# Patient Record
Sex: FEMALE | Race: WHITE | HISPANIC OR LATINO | Employment: UNEMPLOYED | URBAN - METROPOLITAN AREA
[De-identification: names, ages, dates, MRNs, and addresses within clinical notes are randomized per-mention and may not be internally consistent; named-entity substitution may affect disease eponyms.]

---

## 2017-05-18 ENCOUNTER — APPOINTMENT (OUTPATIENT)
Dept: ULTRASOUND IMAGING | Facility: CLINIC | Age: 22
End: 2017-05-18
Attending: EMERGENCY MEDICINE

## 2017-05-18 ENCOUNTER — HOSPITAL ENCOUNTER (EMERGENCY)
Facility: CLINIC | Age: 22
Discharge: HOME | End: 2017-05-18
Attending: EMERGENCY MEDICINE | Admitting: EMERGENCY MEDICINE

## 2017-05-18 VITALS
WEIGHT: 134.48 LBS | OXYGEN SATURATION: 100 % | SYSTOLIC BLOOD PRESSURE: 109 MMHG | RESPIRATION RATE: 20 BRPM | DIASTOLIC BLOOD PRESSURE: 68 MMHG | HEART RATE: 91 BPM | TEMPERATURE: 98.3 F

## 2017-05-18 DIAGNOSIS — Z33.1 PREGNANCY, INCIDENTAL: ICD-10-CM

## 2017-05-18 DIAGNOSIS — S83.509A: ICD-10-CM

## 2017-05-18 DIAGNOSIS — R10.30 LOWER ABDOMINAL PAIN: ICD-10-CM

## 2017-05-18 DIAGNOSIS — M54.50 ACUTE MIDLINE LOW BACK PAIN WITHOUT SCIATICA: ICD-10-CM

## 2017-05-18 LAB
ABO + RH BLD: NORMAL
ABO + RH BLD: NORMAL
ALBUMIN UR-MCNC: NEGATIVE MG/DL
ANION GAP SERPL CALCULATED.3IONS-SCNC: 6 MMOL/L (ref 3–14)
APPEARANCE UR: CLEAR
B-HCG SERPL-ACNC: ABNORMAL IU/L (ref 0–5)
BACTERIA #/AREA URNS HPF: ABNORMAL /HPF
BASOPHILS # BLD AUTO: 0 10E9/L (ref 0–0.2)
BASOPHILS NFR BLD AUTO: 0.5 %
BILIRUB UR QL STRIP: NEGATIVE
BLOOD BANK CMNT PATIENT-IMP: NORMAL
BLOOD BANK CMNT PATIENT-IMP: NORMAL
BUN SERPL-MCNC: 7 MG/DL (ref 7–30)
CALCIUM SERPL-MCNC: 8.6 MG/DL (ref 8.5–10.1)
CHLORIDE SERPL-SCNC: 107 MMOL/L (ref 94–109)
CO2 SERPL-SCNC: 25 MMOL/L (ref 20–32)
COLOR UR AUTO: YELLOW
CREAT SERPL-MCNC: 0.46 MG/DL (ref 0.52–1.04)
DIFFERENTIAL METHOD BLD: ABNORMAL
EOSINOPHIL # BLD AUTO: 0 10E9/L (ref 0–0.7)
EOSINOPHIL NFR BLD AUTO: 0.3 %
ERYTHROCYTE [DISTWIDTH] IN BLOOD BY AUTOMATED COUNT: 15.8 % (ref 10–15)
FETAL CELL SCN BLD QL ROSETTE: NORMAL
GFR SERPL CREATININE-BSD FRML MDRD: ABNORMAL ML/MIN/1.7M2
GLUCOSE SERPL-MCNC: 93 MG/DL (ref 70–99)
GLUCOSE UR STRIP-MCNC: NEGATIVE MG/DL
HCG UR QL: POSITIVE
HCT VFR BLD AUTO: 38.2 % (ref 35–47)
HGB BLD-MCNC: 12 G/DL (ref 11.7–15.7)
HGB UR QL STRIP: NEGATIVE
IMM GRANULOCYTES # BLD: 0 10E9/L (ref 0–0.4)
IMM GRANULOCYTES NFR BLD: 0.3 %
KETONES UR STRIP-MCNC: 5 MG/DL
LEUKOCYTE ESTERASE UR QL STRIP: NEGATIVE
LYMPHOCYTES # BLD AUTO: 1.9 10E9/L (ref 0.8–5.3)
LYMPHOCYTES NFR BLD AUTO: 24.7 %
MCH RBC QN AUTO: 26.5 PG (ref 26.5–33)
MCHC RBC AUTO-ENTMCNC: 31.4 G/DL (ref 31.5–36.5)
MCV RBC AUTO: 84 FL (ref 78–100)
MICRO REPORT STATUS: NORMAL
MONOCYTES # BLD AUTO: 0.8 10E9/L (ref 0–1.3)
MONOCYTES NFR BLD AUTO: 9.7 %
MUCOUS THREADS #/AREA URNS LPF: PRESENT /LPF
NEUTROPHILS # BLD AUTO: 5.1 10E9/L (ref 1.6–8.3)
NEUTROPHILS NFR BLD AUTO: 64.5 %
NITRATE UR QL: NEGATIVE
NRBC # BLD AUTO: 0 10*3/UL
NRBC BLD AUTO-RTO: 0 /100
PH UR STRIP: 8 PH (ref 5–7)
PLATELET # BLD AUTO: 357 10E9/L (ref 150–450)
POTASSIUM SERPL-SCNC: 3.5 MMOL/L (ref 3.4–5.3)
RBC # BLD AUTO: 4.53 10E12/L (ref 3.8–5.2)
RBC #/AREA URNS AUTO: <1 /HPF (ref 0–2)
RH IG VIALS RECOM PATIENT: NORMAL
SODIUM SERPL-SCNC: 138 MMOL/L (ref 133–144)
SP GR UR STRIP: 1.01 (ref 1–1.03)
SPECIMEN SOURCE: NORMAL
SQUAMOUS #/AREA URNS AUTO: 1 /HPF (ref 0–1)
URN SPEC COLLECT METH UR: ABNORMAL
UROBILINOGEN UR STRIP-MCNC: 0 MG/DL (ref 0–2)
WBC # BLD AUTO: 7.9 10E9/L (ref 4–11)
WBC #/AREA URNS AUTO: <1 /HPF (ref 0–2)
WET PREP SPEC: NORMAL

## 2017-05-18 PROCEDURE — 81001 URINALYSIS AUTO W/SCOPE: CPT | Performed by: EMERGENCY MEDICINE

## 2017-05-18 PROCEDURE — 87210 SMEAR WET MOUNT SALINE/INK: CPT | Performed by: EMERGENCY MEDICINE

## 2017-05-18 PROCEDURE — 96361 HYDRATE IV INFUSION ADD-ON: CPT

## 2017-05-18 PROCEDURE — 81025 URINE PREGNANCY TEST: CPT | Performed by: EMERGENCY MEDICINE

## 2017-05-18 PROCEDURE — 85025 COMPLETE CBC W/AUTO DIFF WBC: CPT | Performed by: EMERGENCY MEDICINE

## 2017-05-18 PROCEDURE — 99284 EMERGENCY DEPT VISIT MOD MDM: CPT | Mod: 25

## 2017-05-18 PROCEDURE — 84702 CHORIONIC GONADOTROPIN TEST: CPT | Performed by: EMERGENCY MEDICINE

## 2017-05-18 PROCEDURE — 96360 HYDRATION IV INFUSION INIT: CPT

## 2017-05-18 PROCEDURE — 80048 BASIC METABOLIC PNL TOTAL CA: CPT | Performed by: EMERGENCY MEDICINE

## 2017-05-18 PROCEDURE — 87591 N.GONORRHOEAE DNA AMP PROB: CPT | Performed by: EMERGENCY MEDICINE

## 2017-05-18 PROCEDURE — 85461 HEMOGLOBIN FETAL: CPT | Performed by: EMERGENCY MEDICINE

## 2017-05-18 PROCEDURE — 86900 BLOOD TYPING SEROLOGIC ABO: CPT | Performed by: EMERGENCY MEDICINE

## 2017-05-18 PROCEDURE — 86901 BLOOD TYPING SEROLOGIC RH(D): CPT | Performed by: EMERGENCY MEDICINE

## 2017-05-18 PROCEDURE — 87491 CHLMYD TRACH DNA AMP PROBE: CPT | Performed by: EMERGENCY MEDICINE

## 2017-05-18 PROCEDURE — 76801 OB US < 14 WKS SINGLE FETUS: CPT

## 2017-05-18 PROCEDURE — 25000128 H RX IP 250 OP 636: Performed by: EMERGENCY MEDICINE

## 2017-05-18 RX ORDER — ACETAMINOPHEN AND CODEINE PHOSPHATE 300; 30 MG/1; MG/1
1-2 TABLET ORAL EVERY 6 HOURS PRN
Qty: 20 TABLET | Refills: 0 | Status: SHIPPED | OUTPATIENT
Start: 2017-05-18 | End: 2022-10-30

## 2017-05-18 RX ORDER — LIDOCAINE 40 MG/G
CREAM TOPICAL
Status: DISCONTINUED | OUTPATIENT
Start: 2017-05-18 | End: 2017-05-18 | Stop reason: HOSPADM

## 2017-05-18 RX ORDER — MORPHINE SULFATE 4 MG/ML
4 INJECTION, SOLUTION INTRAMUSCULAR; INTRAVENOUS ONCE
Status: DISCONTINUED | OUTPATIENT
Start: 2017-05-18 | End: 2017-05-18 | Stop reason: HOSPADM

## 2017-05-18 RX ADMIN — SODIUM CHLORIDE 1000 ML: 9 INJECTION, SOLUTION INTRAVENOUS at 16:38

## 2017-05-18 ASSESSMENT — ENCOUNTER SYMPTOMS
ABDOMINAL PAIN: 1
BACK PAIN: 1

## 2017-05-18 NOTE — ED PROVIDER NOTES
History     Chief Complaint:  Back Pain  Abdominal Pain      HPI   Adrienne Guzman is a 21 year old female (), roughly 9 weeks pregnant, who presents after a fall with back pain and abdominal pain. The patient reports that she was carrying her 1-year-old son down carpeted stairs when she tripped and fell down the stairs around 1400 today. She reports that she bounced on her buttock and back until she reached the last step. After the fall, she immediately developed pain in her back and a diffuse soreness in her left leg. She denies hitting her head. The patient then started to develop pain in her lower abdomen which she describes as intermittent pounding and sharp pain that is more severe than menstrual cramps but not as severe as contractions. The patient denies vaginal bleeding. She reports that her abdomen may have been struck by her son's knee, but she is not sure. She presents for concern for her pregnancy. She reports that her pregnancy has been uncomplicated aside from intermittent nausea and lack of appetite. She has not had an ultrasound, but has been seen by a midwife with [facility] in [place].    Allergies:  NKDA    Medications:    Folic acid    Past Medical History:    History reviewed. No pertinent past medical history.      Past Surgical History:    History reviewed. No pertinent past surgical history.     Family History:    History reviewed. No pertinent family history.      Social History:  The patient presents with female friend.    Review of Systems   Gastrointestinal: Positive for abdominal pain.   Genitourinary: Negative for vaginal bleeding.   Musculoskeletal: Positive for back pain.        Positive for left leg soreness.   All other systems reviewed and are negative.    Physical Exam   First Vitals:  BP: 113/69  Pulse: 91  Temp: 98.3  F (36.8  C)  Resp: 20  Weight: 61 kg (134 lb 7.7 oz)  SpO2: 100 %      Physical Exam  General: The patient is alert, in no respiratory  distress.    HENT: Mucous membranes moist.    Cardiovascular: Regular rate and rhythm. Good pulses in all four extremities. Normal capillary refill and skin turgor.     Respiratory: Lungs are clear. No nasal flaring. No retractions. No wheezing, no crackles.    Gastrointestinal: Discomfort with palpation of suprapubic region. Abdomen soft. No guarding, no rebound. No palpable hernias.     Pelvic: No bleeding. Small amount of discharge.     Musculoskeletal: Lower lumbar midline tenderness. No gross deformity.     Skin: No rashes or petechiae.     Neurologic: The patient is alert and oriented x3. GCS 15. No testable cranial nerve deficit. Follows commands with clear and appropriate speech. Gives appropriate answers. Good strength in all extremities. No gross neurologic deficit. Gross sensation intact. Pupils are round and reactive. No meningismus.     Lymphatic: No cervical adenopathy. No lower extremity swelling.    Psychiatric: The patient is non-tearful.     Emergency Department Course     Imaging:  Radiographic findings were communicated with the patient who voiced understanding of the findings.    OB US, <14 Weeks, per radiology:   Single viable intrauterine gestation. Heart rate is 163 bpm.     Laboratory:  CBC: WBC 7.9 (WNL) HGB 12 (WNL)  (WNL) MCHC 31.4 (L) RDW 15.8 (H)  BMP: Creatinine 0.46 (L) Glucose 93 (WNL) Rest WNL   Rh Immune Globulin Study: ABO O RH Pos  HCG Quantitative: 44288      UA: Clear, yellow urine; Ketones 5 (A) pH 8 (H) Bacteria Few (A) Mucous Present (A) Rest WNL  HCG Qualitative: Positive      Wet Prep: Moderate PMN's seen; No yeast seen; No clue cells seen; No Trichomonas seen;  Neisseria Gonorrhea PCR: Pending  Chlamydia Trachomatis PCR: Pending       Interventions:  1638: Normal Saline, 1000 mL, IV injection     ED Course:  Nursing notes and vitals reviewed.  I performed an exam of the patient as documented above.     1810: I performed a pelvic exam. Wet prep sent. Patient reports  that her pain is improving.  1911: I checked in with and updated the patient. She is doing well. She is complaining of bilateral knee pain, but there is no focal tenderness.   1918: I updated patient on results. We discussed plan. She is ready for discharge.    I personally reviewed the laboratory and imaging results with the patient and answered all related questions prior to discharge.   Findings and plan explained to the patient. Patient discharged home with instructions regarding supportive care, medications, and reasons to return. The importance of close follow-up was reviewed.     Impression & Plan      Medical Decision Making:  Adrienne Guzman is a 21 year old female who is 9 weeks pregnant and unfortunately slipped down the stairs. It sounds like thankfully she fell on her bottom and bounced down the stairs and therefore there is not a lot of force because this is a series of small falls. She mainly complains of back pain. I did not feel like at her stage in the pregnancy with lack of bleeding that there was a high risk to the pregnancy. However, she was complaining of some low abdominal cramping. Cervix however was closed. Her Rh type was positive and her ultrasound here looked reassuring. I did discuss with the patient that I cannot rule out a subchorionic hemorrhage and ultrasound is not 100% sensitive to that. She however was observed here and was otherwise stable. Her labs are reassuring. She did complain of some knee pain but we will not do x-rays as she is at low risk for fracture. We discussed symptomatic treatments. She was discharged home with pain medication and told not to take ibuprofen. She will follow up with her midwife and primary care doctor. She    Diagnosis:    ICD-10-CM    1. Lower abdominal pain R10.30    2. Acute midline low back pain without sciatica M54.5    3. Pregnancy, incidental Z33.1    4. Sprain of cruciate ligament of knee, unspecified laterality, initial  encounter S83.509A      Disposition:   Discharge to home with primary care follow up.     Discharge Medications:   New Prescriptions    ACETAMINOPHEN-CODEINE (TYLENOL/CODEINE #3) 300-30 MG PER TABLET    Take 1-2 tablets by mouth every 6 hours as needed for pain     IKatie, am serving as a scribe on 5/18/2017 at 4:22 PM to personally document services performed by Lupillo Montes De Oca MD, based on my observations and the provider's statements to me.           Lupillo Montes De Oca MD  05/18/17 [id number]

## 2017-05-18 NOTE — ED NOTES
"Pt is 9 weeks pregnant - EDC 12/21/2017. Pt fell down 3-4 steps, states that she slipped. Pt is now have low back pain and low \"sharp\" abd pain. Denies vaginal bleeding or discharge.   "

## 2017-05-18 NOTE — ED AVS SNAPSHOT
[facility] Emergency Department    [address] [place] [address]    Phone:  [phone]    Fax:  [phone]                                       Adrienne Guzman   MRN: [id number]    Department:  [facility] Emergency Department   Date of Visit:  5/18/2017           Patient Information     Date Of Birth          1995        Your diagnoses for this visit were:     Lower abdominal pain     Acute midline low back pain without sciatica     Pregnancy, incidental     Sprain of cruciate ligament of knee, unspecified laterality, initial encounter        You were seen by Lupillo Montes De Oca MD.      Follow-up Information     Follow up with Katarina Fitzpatrick In 1 day.    Contact information:    [address]  Attn: [facility] [id number]          Discharge Instructions       Discharge Instructions  Trauma    You were seen today for an injury due to some kind of trauma (crash, fall, etc.).  Some injuries may not show up until after you leave the Emergency Department.  It is important that you pay attention to these instructions and follow-up with your regular doctor as instructed.    Return to the Emergency Department right away if:    You have abdominal pain or bruises, chest pain, pain in a new area, or pain that is getting worse.    You get short of breath.    You develop a fever over 101 degrees.    You have weakness in your arms or legs.    You faint or you are very lightheaded.    You have any new symptoms, you are feeling weak or unusually ill, or something worries you.     Injuries to the brain are possible with any accident.  Return right away if you have confusion, vomiting more than once, difficulty walking or a headache that is getting worse. Bring a child or a person who can t talk back if they seem to be behaving in an abnormal way.      MORE INFORMATION:    General Injuries:    Aches and pains are usually worse the day after your  accident, but should not be severe, and should start getting better after that. Aches and pains are common in the neck and back.    Injuries from your accident may prevent you from working.  Follow-up with your regular doctor to get a work note and to find out how long you will not be able to work.    Pain medications or your injuries may make it unsafe for you to drive or operate machinery.    Use ice to injured areas for the first one or two days. Apply a bag of ice wrapped in a cloth for about 15 minutes at a time. You can do this as often as once an hour. Do not sleep with an ice pack, since it can burn you.     You can use non-prescription pain medicine, like Tylenol  (acetaminophen)be sure you aren t taking more than 3000 mg per day.    Limit your activity for at least one or two days. Avoid doing things that hurt.    You need to see your doctor if any injured area is not back to normal in 1 week.    Fractures, Sprains, and Strains:    Return to the Emergency Department right away if your injured area gets more painful, if the splint or dressing seems to be too tight, if it gets numb or tingly past the injury, or if the area past the injury gets pale, blue, or cold.    Use your crutches if you were given them today. Don t put weight on the injured area until the pain is gone.    Keep the injured area above the level of your heart while laying or sitting down.  This well help lessen the swelling (puffiness) and the pain.    You may use an elastic bandage (Ace  Wrap) if it makes you more comfortable. Wrap it just tight enough to provide mild compression, and loosen it if you get swelling past the bandage.    Note about X-rays: If you had x-rays done today, they were read by your emergency physician. They will also be read later by a radiologist. We will contact you if the radiologist thinks they show something different than the emergency physician did. Remember that there are some fractures (breaks in the bone)  that can t be seen right away. Even if your x-rays today were normal, you must see your doctor in clinic to re-check.     Splints:    A splint put on in the Emergency Department is temporary. Your regular doctor or orthopedic doctor will remove it, and replace it with a cast or boot if needed.    Keep the splint dry. Cover it with a plastic bag when you wash. Even with a plastic bag, you still can t get in water or let water get right on it. If it does get wet, you should come back or see your doctor to have it replaced.    Do not put objects inside the splint to scratch.    If there is an elastic bandage (Ace  Wrap) holding the splint on this may be loosened a little to relieve pressure or pain.  If pain continues return to the Emergency Department right away.    Return if the splint starts cutting into your skin.    Do not remove your splint by yourself unless told to by your doctor. You can t take it off and put it back on again.     Wounds:    Infections can follow many injuries. Watch for fevers, redness spreading from the wound, pus or stitches that open up. Return here or see your doctor if these happen.    There can always be glass, wood, dirt or other things in any wound.  They won t always show up even on x-rays.  If a wound doesn t heal, this may be why, and it is important to follow-up with your regular doctor. Small pieces of glass or other materials may work their way out on their own.    Cuts or scrapes may start to bleed after leaving the Emergency Department.  If this happens, hold pressure on the bleeding area with a clean cloth or put pressure over the bandage.  If the bleeding doesn t stop after you use constant pressure for   hour, you should return to the Emergency Department for further treatment.    Any bandage or dressing put on here should be removed in 12-24 hours, or as your doctor instructs. Remove the dressing sooner if it seems too tight or painful, or if it is getting numb, tingly, or  pale past the dressing.    After you take off the dressing, wash the cut or scrape with soap and water once or twice a day.    Apply ointment like Bacitracin  (polypeptide antibiotic) to scrapes or cuts, and keep them covered with a Band-Aid  or gauze if possible, until they heal up or until your stitches are taken out.    Dermabond  or Steri-Strips  should be left alone and will come off by themselves.  Dissolving stitches should go away or fall out within about a week.    Regular stitches need to be taken out by your doctor in clinic.  Call today and schedule an appointment.  Leave your stitches in for as long as you were told today.    Most injuries are preventable!  As your local emergency physicians, we encourage you to:    Wear your seat belt.    Do not talk on your cell phone while driving.    Do not read or send text messages while driving.    Wear a bike or motorcycle helmet.    Wear a helmet while skiing and snowboarding.    Wear personal flotation devices at all times while on the water.    Always have your child in a car seat.    Do not allow children less than 12 years old to ride in the front seat.    Go to the CDC website to find more information on preventing injures:  http://www.cdc.gov/injury/index.html    If you were given a prescription for medicine here today, be sure to read all of the information (including the package insert) that comes with your prescription.  This will include important information about the medicine, its side effects, and any warnings that you need to know about.  The pharmacist who fills the prescription can provide more information and answer questions you may have about the medicine.  If you have questions or concerns that the pharmacist cannot address, please call or return to the Emergency Department.     Remember that you can always come back to the Emergency Department if you are not able to see your regular doctor in the amount of time listed above, if you get any  new symptoms, or if there is anything that worries you.          24 Hour Appointment Hotline       To make an appointment at any [facility] call [phone] ([phone]). If you don't have a family doctor or clinic, we will help you find one. [place] clinics are conveniently located to serve the needs of you and your family.             Review of your medicines      START taking        Dose / Directions Last dose taken    acetaminophen-codeine 300-30 MG per tablet   Commonly known as:  TYLENOL/codeine #3   Dose:  1-2 tablet   Quantity:  20 tablet        Take 1-2 tablets by mouth every 6 hours as needed for pain   Refills:  0          Our records show that you are taking the medicines listed below. If these are incorrect, please call your family doctor or clinic.        Dose / Directions Last dose taken    FOLIC ACID PO   Dose:  1 mg        Take 1 mg by mouth daily   Refills:  0                Prescriptions were sent or printed at these locations (1 Prescription)                   Other Prescriptions                Printed at Department/Unit printer (1 of 1)         acetaminophen-codeine (TYLENOL/CODEINE #3) 300-30 MG per tablet                Procedures and tests performed during your visit     Basic metabolic panel    CBC with platelets differential    Chlamydia trachomatis PCR    HCG qualitative urine    HCG quantitative pregnancy    Neisseria gonorrhoea PCR    Peripheral IV: Standard    Rh Immune Globulin Study    Rho (D) immune globulin (RhoGam) Lab Study    UA with Microscopic reflex to Culture    US OB < 14 Weeks Single    Vital signs    Wet prep      Orders Needing Specimen Collection     None      Pending Results     Date and Time Order Name Status Description    5/18/2017 1816 Neisseria gonorrhoea PCR In process     5/18/2017 1816 Chlamydia trachomatis PCR In process             Pending Culture Results     Date and Time Order Name Status Description    5/18/2017 1816 Neisseria gonorrhoea PCR  In process     5/18/2017 1816 Chlamydia trachomatis PCR In process             Pending Results Instructions     If you had any lab results that were not finalized at the time of your Discharge, you can call the ED Lab Result RN at [phone]. You will be contacted by this team for any positive Lab results or changes in treatment. The nurses are available 7 days a week from 10A to 6:30P.  You can leave a message 24 hours per day and they will return your call.        Test Results From Your Hospital Stay        5/18/2017  6:23 PM      Component Results     Component Value Ref Range & Units Status    HCG Qual Urine Positive (A) NEG Final         5/18/2017  6:21 PM      Component Results     Component Value Ref Range & Units Status    Color Urine Yellow  Final    Appearance Urine Clear  Final    Glucose Urine Negative NEG mg/dL Final    Bilirubin Urine Negative NEG Final    Ketones Urine 5 (A) NEG mg/dL Final    Specific Gravity Urine 1.012 1.003 - 1.035 Final    Blood Urine Negative NEG Final    pH Urine 8.0 (H) 5.0 - 7.0 pH Final    Protein Albumin Urine Negative NEG mg/dL Final    Urobilinogen mg/dL 0.0 0.0 - 2.0 mg/dL Final    Nitrite Urine Negative NEG Final    Leukocyte Esterase Urine Negative NEG Final    Source Midstream Urine  Final    WBC Urine <1 0 - 2 /HPF Final    RBC Urine <1 0 - 2 /HPF Final    Bacteria Urine Few (A) NEG /HPF Final    Squamous Epithelial /HPF Urine 1 0 - 1 /HPF Final    Mucous Urine Present (A) NEG /LPF Final         5/18/2017  4:50 PM      Component Results     Component Value Ref Range & Units Status    WBC 7.9 4.0 - 11.0 10e9/L Final    RBC Count 4.53 3.8 - 5.2 10e12/L Final    Hemoglobin 12.0 11.7 - 15.7 g/dL Final    Hematocrit 38.2 35.0 - 47.0 % Final    MCV 84 78 - 100 fl Final    MCH 26.5 26.5 - 33.0 pg Final    MCHC 31.4 (L) 31.5 - 36.5 g/dL Final    RDW 15.8 (H) 10.0 - 15.0 % Final    Platelet Count 357 150 - 450 10e9/L Final    Diff Method Automated Method  Final    %  Neutrophils 64.5 % Final    % Lymphocytes 24.7 % Final    % Monocytes 9.7 % Final    % Eosinophils 0.3 % Final    % Basophils 0.5 % Final    % Immature Granulocytes 0.3 % Final    Nucleated RBCs 0 0 /100 Final    Absolute Neutrophil 5.1 1.6 - 8.3 10e9/L Final    Absolute Lymphocytes 1.9 0.8 - 5.3 10e9/L Final    Absolute Monocytes 0.8 0.0 - 1.3 10e9/L Final    Absolute Eosinophils 0.0 0.0 - 0.7 10e9/L Final    Absolute Basophils 0.0 0.0 - 0.2 10e9/L Final    Abs Immature Granulocytes 0.0 0 - 0.4 10e9/L Final    Absolute Nucleated RBC 0.0  Final         5/18/2017  5:05 PM      Component Results     Component Value Ref Range & Units Status    Sodium 138 133 - 144 mmol/L Final    Potassium 3.5 3.4 - 5.3 mmol/L Final    Chloride 107 94 - 109 mmol/L Final    Carbon Dioxide 25 20 - 32 mmol/L Final    Anion Gap 6 3 - 14 mmol/L Final    Glucose 93 70 - 99 mg/dL Final    Urea Nitrogen 7 7 - 30 mg/dL Final    Creatinine 0.46 (L) 0.52 - 1.04 mg/dL Final    GFR Estimate >90  Non  GFR Calc   >60 mL/min/1.7m2 Final    GFR Estimate If Black >90   GFR Calc   >60 mL/min/1.7m2 Final    Calcium 8.6 8.5 - 10.1 mg/dL Final         5/18/2017  4:47 PM      Component Results     Component    Rhogam Order    Order received   See Rhogam Study/Suitability                 5/18/2017  5:42 PM      Component Results     Component    ABO    O    RH(D)     Pos    Fetal Blood Screen    Canceled, Test credited    Blood Bank Comment    Not suitable for Rh Immune Globulin  PATIENT RH POSITIVE      Amount of RHIG Required    Not suitable for Rh Immune Globulin         5/18/2017  6:45 PM      Narrative     US OB < 14 WEEKS SINGLE  5/18/2017 5:33 PM     HISTORY:  fall down stairs with abd crampiing, eval pregnancy    COMPARISON: None:    TECHNIQUE: Transabdominal imaging was performed.    FINDINGS:  There is an intrauterine gestation. Mean sac diameter is  3.4 cm corresponding to an age of 8 weeks 6 days. A yolk sac is  seen.  An embryo is seen. Crown-rump length 2.1 cm corresponding to an age of  8 weeks 5 days. Heart rate 1 63 bpm. Estimated date of delivery by  crown-rump length is 12/23/2017. Right ovary is not seen. Left ovary  appears normal. No hemorrhage.        Impression     IMPRESSION: Single viable intrauterine gestation. Heart rate is 1 63  bpm.    VAMSI OSEI MD         5/18/2017  6:51 PM      Component Results     Component Value Ref Range & Units Status    HCG Quantitative Serum 32167 (H) 0 - 5 IU/L Final    Specimen run with a dilution         5/18/2017  6:31 PM      Component Results     Component    Specimen Description    Vagina    Wet Prep    Moderate PMNs seen  No yeast seen  No clue cells seen  No Trichomonas seen      Micro Report Status    FINAL 05/18/2017 5/18/2017  6:23 PM         5/18/2017  6:23 PM                Clinical Quality Measure: Blood Pressure Screening     Your blood pressure was checked while you were in the emergency department today. The last reading we obtained was  BP: 109/68 . Please read the guidelines below about what these numbers mean and what you should do about them.  If your systolic blood pressure (the top number) is less than 120 and your diastolic blood pressure (the bottom number) is less than 80, then your blood pressure is normal. There is nothing more that you need to do about it.  If your systolic blood pressure (the top number) is 120-139 or your diastolic blood pressure (the bottom number) is 80-89, your blood pressure may be higher than it should be. You should have your blood pressure rechecked within a year by a primary care provider.  If your systolic blood pressure (the top number) is 140 or greater or your diastolic blood pressure (the bottom number) is 90 or greater, you may have high blood pressure. High blood pressure is treatable, but if left untreated over time it can put you at risk for heart attack, stroke, or kidney failure. You should have your  "blood pressure rechecked by a primary care provider within the next 4 weeks.  If your provider in the emergency department today gave you specific instructions to follow-up with your doctor or provider even sooner than that, you should follow that instruction and not wait for up to 4 weeks for your follow-up visit.        Thank you for choosing [facility]       Thank you for choosing [facility] for your care. Our goal is always to provide you with excellent care. Hearing back from our patients is one way we can continue to improve our services. Please take a few minutes to complete the written survey that you may receive in the mail after you visit with us. Thank you!        [employer] Information     [facility] lets you send messages to your doctor, view your test results, renew your prescriptions, schedule appointments and more. To sign up, go to [email or web address] . Click on \"Log in\" on the left side of the screen, which will take you to the Welcome page. Then click on \"Sign up Now\" on the right side of the page.     You will be asked to enter the access code listed below, as well as some personal information. Please follow the directions to create your username and password.     Your access code is: [email or web address]  Expires: 2017  7:15 PM     Your access code will  in 90 days. If you need help or a new code, please call your [facility] clinic or [phone].        Care EveryWhere ID     This is your Care EveryWhere ID. This could be used by other organizations to access your [facility] medical records  [id number]        After Visit Summary       This is your record. Keep this with you and show to your community pharmacist(s) and doctor(s) at your next visit.                  "

## 2017-05-18 NOTE — ED AVS SNAPSHOT
[facility] Emergency Department    [address]    [facility] [address]    Phone:  [phone]    Fax:  [phone]                                       Adrienne Guzman   MRN: [id number]    Department:  [facility] Emergency Department   Date of Visit:  5/18/2017           After Visit Summary Signature Page     I have received my discharge instructions, and my questions have been answered. I have discussed any challenges I see with this plan with the nurse or doctor.    ..........................................................................................................................................  Patient/Patient Representative Signature      ..........................................................................................................................................  Patient Representative Print Name and Relationship to Patient    ..................................................               ................................................  Date                                            Time    ..........................................................................................................................................  Reviewed by Signature/Title    ...................................................              ..............................................  Date                                                            Time

## 2017-05-19 LAB
C TRACH DNA SPEC QL NAA+PROBE: NORMAL
N GONORRHOEA DNA SPEC QL NAA+PROBE: NORMAL
SPECIMEN SOURCE: NORMAL
SPECIMEN SOURCE: NORMAL

## 2017-05-19 NOTE — DISCHARGE INSTRUCTIONS
Discharge Instructions  Trauma    You were seen today for an injury due to some kind of trauma (crash, fall, etc.).  Some injuries may not show up until after you leave the Emergency Department.  It is important that you pay attention to these instructions and follow-up with your regular doctor as instructed.    Return to the Emergency Department right away if:    You have abdominal pain or bruises, chest pain, pain in a new area, or pain that is getting worse.    You get short of breath.    You develop a fever over 101 degrees.    You have weakness in your arms or legs.    You faint or you are very lightheaded.    You have any new symptoms, you are feeling weak or unusually ill, or something worries you.     Injuries to the brain are possible with any accident.  Return right away if you have confusion, vomiting more than once, difficulty walking or a headache that is getting worse. Bring a child or a person who can t talk back if they seem to be behaving in an abnormal way.      MORE INFORMATION:    General Injuries:    Aches and pains are usually worse the day after your accident, but should not be severe, and should start getting better after that. Aches and pains are common in the neck and back.    Injuries from your accident may prevent you from working.  Follow-up with your regular doctor to get a work note and to find out how long you will not be able to work.    Pain medications or your injuries may make it unsafe for you to drive or operate machinery.    Use ice to injured areas for the first one or two days. Apply a bag of ice wrapped in a cloth for about 15 minutes at a time. You can do this as often as once an hour. Do not sleep with an ice pack, since it can burn you.     You can use non-prescription pain medicine, like Tylenol  (acetaminophen)be sure you aren t taking more than 3000 mg per day.    Limit your activity for at least one or two days. Avoid doing things that hurt.    You need to see your  doctor if any injured area is not back to normal in 1 week.    Fractures, Sprains, and Strains:    Return to the Emergency Department right away if your injured area gets more painful, if the splint or dressing seems to be too tight, if it gets numb or tingly past the injury, or if the area past the injury gets pale, blue, or cold.    Use your crutches if you were given them today. Don t put weight on the injured area until the pain is gone.    Keep the injured area above the level of your heart while laying or sitting down.  This well help lessen the swelling (puffiness) and the pain.    You may use an elastic bandage (Ace  Wrap) if it makes you more comfortable. Wrap it just tight enough to provide mild compression, and loosen it if you get swelling past the bandage.    Note about X-rays: If you had x-rays done today, they were read by your emergency physician. They will also be read later by a radiologist. We will contact you if the radiologist thinks they show something different than the emergency physician did. Remember that there are some fractures (breaks in the bone) that can t be seen right away. Even if your x-rays today were normal, you must see your doctor in clinic to re-check.     Splints:    A splint put on in the Emergency Department is temporary. Your regular doctor or orthopedic doctor will remove it, and replace it with a cast or boot if needed.    Keep the splint dry. Cover it with a plastic bag when you wash. Even with a plastic bag, you still can t get in water or let water get right on it. If it does get wet, you should come back or see your doctor to have it replaced.    Do not put objects inside the splint to scratch.    If there is an elastic bandage (Ace  Wrap) holding the splint on this may be loosened a little to relieve pressure or pain.  If pain continues return to the Emergency Department right away.    Return if the splint starts cutting into your skin.    Do not remove your splint  by yourself unless told to by your doctor. You can t take it off and put it back on again.     Wounds:    Infections can follow many injuries. Watch for fevers, redness spreading from the wound, pus or stitches that open up. Return here or see your doctor if these happen.    There can always be glass, wood, dirt or other things in any wound.  They won t always show up even on x-rays.  If a wound doesn t heal, this may be why, and it is important to follow-up with your regular doctor. Small pieces of glass or other materials may work their way out on their own.    Cuts or scrapes may start to bleed after leaving the Emergency Department.  If this happens, hold pressure on the bleeding area with a clean cloth or put pressure over the bandage.  If the bleeding doesn t stop after you use constant pressure for   hour, you should return to the Emergency Department for further treatment.    Any bandage or dressing put on here should be removed in 12-24 hours, or as your doctor instructs. Remove the dressing sooner if it seems too tight or painful, or if it is getting numb, tingly, or pale past the dressing.    After you take off the dressing, wash the cut or scrape with soap and water once or twice a day.    Apply ointment like Bacitracin  (polypeptide antibiotic) to scrapes or cuts, and keep them covered with a Band-Aid  or gauze if possible, until they heal up or until your stitches are taken out.    Dermabond  or Steri-Strips  should be left alone and will come off by themselves.  Dissolving stitches should go away or fall out within about a week.    Regular stitches need to be taken out by your doctor in clinic.  Call today and schedule an appointment.  Leave your stitches in for as long as you were told today.    Most injuries are preventable!  As your local emergency physicians, we encourage you to:    Wear your seat belt.    Do not talk on your cell phone while driving.    Do not read or send text messages while  driving.    Wear a bike or motorcycle helmet.    Wear a helmet while skiing and snowboarding.    Wear personal flotation devices at all times while on the water.    Always have your child in a car seat.    Do not allow children less than 12 years old to ride in the front seat.    Go to the CDC website to find more information on preventing injures:  http://www.cdc.gov/injury/index.html    If you were given a prescription for medicine here today, be sure to read all of the information (including the package insert) that comes with your prescription.  This will include important information about the medicine, its side effects, and any warnings that you need to know about.  The pharmacist who fills the prescription can provide more information and answer questions you may have about the medicine.  If you have questions or concerns that the pharmacist cannot address, please call or return to the Emergency Department.     Remember that you can always come back to the Emergency Department if you are not able to see your regular doctor in the amount of time listed above, if you get any new symptoms, or if there is anything that worries you.

## 2018-04-15 ENCOUNTER — HOSPITAL ENCOUNTER (EMERGENCY)
Facility: CLINIC | Age: 23
Discharge: HOME | End: 2018-04-16
Attending: EMERGENCY MEDICINE | Admitting: EMERGENCY MEDICINE

## 2018-04-15 DIAGNOSIS — N30.91 HEMORRHAGIC CYSTITIS: ICD-10-CM

## 2018-04-15 LAB
ALBUMIN UR-MCNC: 30 MG/DL
APPEARANCE UR: ABNORMAL
BACTERIA #/AREA URNS HPF: ABNORMAL /HPF
BILIRUB UR QL STRIP: NEGATIVE
COLOR UR AUTO: YELLOW
GLUCOSE UR STRIP-MCNC: NEGATIVE MG/DL
HCG UR QL: NEGATIVE
HGB UR QL STRIP: ABNORMAL
KETONES UR STRIP-MCNC: NEGATIVE MG/DL
LEUKOCYTE ESTERASE UR QL STRIP: ABNORMAL
MUCOUS THREADS #/AREA URNS LPF: PRESENT /LPF
NITRATE UR QL: NEGATIVE
PH UR STRIP: 5 PH (ref 5–7)
RBC #/AREA URNS AUTO: 81 /HPF (ref 0–2)
SOURCE: ABNORMAL
SP GR UR STRIP: 1.02 (ref 1–1.03)
SQUAMOUS #/AREA URNS AUTO: 6 /HPF (ref 0–1)
UROBILINOGEN UR STRIP-MCNC: 0 MG/DL (ref 0–2)
WBC #/AREA URNS AUTO: 95 /HPF (ref 0–5)

## 2018-04-15 PROCEDURE — 25000132 ZZH RX MED GY IP 250 OP 250 PS 637: Performed by: EMERGENCY MEDICINE

## 2018-04-15 PROCEDURE — 81025 URINE PREGNANCY TEST: CPT | Performed by: EMERGENCY MEDICINE

## 2018-04-15 PROCEDURE — 99283 EMERGENCY DEPT VISIT LOW MDM: CPT

## 2018-04-15 PROCEDURE — 81001 URINALYSIS AUTO W/SCOPE: CPT | Performed by: EMERGENCY MEDICINE

## 2018-04-15 RX ORDER — PHENAZOPYRIDINE HYDROCHLORIDE 100 MG/1
200 TABLET, FILM COATED ORAL ONCE
Status: COMPLETED | OUTPATIENT
Start: 2018-04-15 | End: 2018-04-15

## 2018-04-15 RX ADMIN — PHENAZOPYRIDINE HYDROCHLORIDE 200 MG: 100 TABLET ORAL at 23:42

## 2018-04-15 NOTE — ED AVS SNAPSHOT
[facility] Emergency Department    [address] [place] [address]    Phone:  [phone]    Fax:  [phone]                                       Adrienne Guzman   MRN: [id number]    Department:  [facility] Emergency Department   Date of Visit:  4/15/2018           Patient Information     Date Of Birth          1995        Your diagnoses for this visit were:     Hemorrhagic cystitis        You were seen by Juan Carlos Olvera MD.      Follow-up Information     Follow up with Madeline Fitzpatrick Schedule an appointment as soon as possible for a visit in 1 week.    Why:  As needed    Contact information:    [address]  Attn: [facility] [address]          Discharge Instructions       Discharge Instructions  Urinary Tract Infection  You or your child have been diagnosed with a urinary tract infection, or UTI. The urinary tract includes the kidneys (which make urine/pee), ureters (the tubes that carry urine/pee from the kidneys to the bladder), the bladder (which stores urine/pee), and urethra (the tube that carries urine/pee out of the bladder). Urinary tract infections occur when bacteria travel up the urethra into the bladder (bladder infection) and, in some cases, from there into the kidneys (kidney infection).  Generally, every Emergency Department visit should have a follow-up clinic visit with either a primary or a specialty clinic/provider. Please follow-up as instructed by your emergency provider today.  Return to the Emergency Department if:    You or your child have severe back pain.    You or your child are vomiting (throwing up) so that you cannot take your medicine.    You or your child have a new fever (had not previously had a fever) over 101 F.    You or your child have confusion or are very weak, or feel very ill.    Your child seems much more ill, will not wake up, will not respond right, or is crying for a long time and  will not calm down.    You or your child are showing signs of dehydration. These signs may include decreased urination (pee), dry mouth/gums/tongue, or decreased activity.    Follow-up with your provider:     Children under 24 months need to be seen by their regular provider within one week after a diagnosis of a UTI. It may be necessary to do some more tests to look at the child s kidney or bladder.    You should begin to feel better within 24 - 48 hours of starting your antibiotic; follow-up with your regular clinic/doctor/provider if this is not the case.    Treatment:     You will be treated with an antibiotic to kill the bacteria. We have to make an educated guess, based on what we know about common bacteria and antibiotics, as to which antibiotic will work for your infection. We will be correct most times but there will be some cases where the antibiotic chosen is not correct (see urine cultures below).    Take a pain medication such as acetaminophen (Tylenol ) or ibuprofen (Advil , Motrin , Nuprin ).    Phenazopyridine (Pyridium , Uristat ) is a prescription medication that numbs the bladder to reduce the burning pain of some UTIs.  The same medication is available in a non-prescription version (Azo-Standard , Urodol ). This medication will change the color of the urine and tears (usually blue or orange). If you wear contacts, do not wear them while taking this medication as they may be stained by the medication.    Urine Cultures:    If indicated, a urine culture may have been performed today. This test generally takes 24-48 hours to complete so the results are not known at this time. The results can confirm that an infection is present but also determine which antibiotic is effective for the specific bacteria that is causing the infection. If your urine culture shows that the antibiotic you were given today will not work to treat your infection, we will attempt to contact you to make arrangements to change  "the antibiotic. If the culture confirms that the antibiotic is effective for your infection, you will not be contacted. We often recommend follow-up with your regular physician/provider on the culture results regardless of this process.    Antibiotic Warning:     If you have been placed on antibiotics - watch for signs of allergic reaction.  These include rash, lip swelling, difficulty breathing, wheezing, and dizziness.  If you develop any of these symptoms, stop the antibiotic immediately and go to an emergency room or urgent care for evaluation.    Probiotics: If you have been given an antibiotic, you may want to also take a probiotic pill or eat yogurt with live cultures. Probiotics have \"good bacteria\" to help your intestines stay healthy. Studies have shown that probiotics help prevent diarrhea and other intestine problems (including C. diff infection) when you take antibiotics. You can buy these without a prescription in the pharmacy section of the store.   If you were given a prescription for medicine here today, be sure to read all of the information (including the package insert) that comes with your prescription.  This will include important information about the medicine, its side effects, and any warnings that you need to know about.  The pharmacist who fills the prescription can provide more information and answer questions you may have about the medicine.  If you have questions or concerns that the pharmacist cannot address, please call or return to the Emergency Department.   Remember that you can always come back to the Emergency Department if you are not able to see your regular provider in the amount of time listed above, if you get any new symptoms, or if there is anything that worries you.      24 Hour Appointment Hotline       To make an appointment at any [facility] call [phone] ([phone]). If you don't have a family doctor or clinic, we will help you find one. [facility] " clinics are conveniently located to serve the needs of you and your family.             Review of your medicines      START taking        Dose / Directions Last dose taken    nitroFURantoin (macrocrystal-monohydrate) 100 MG capsule   Commonly known as:  MACROBID   Dose:  100 mg   Quantity:  10 capsule        Take 1 capsule (100 mg) by mouth 2 times daily for 5 days   Refills:  0        phenazopyridine 200 MG tablet   Commonly known as:  PYRIDIUM   Dose:  200 mg   Quantity:  9 tablet        Take 1 tablet (200 mg) by mouth 3 times daily for 3 days   Refills:  0          Our records show that you are taking the medicines listed below. If these are incorrect, please call your family doctor or clinic.        Dose / Directions Last dose taken    acetaminophen-codeine 300-30 MG per tablet   Commonly known as:  TYLENOL WITH CODEINE #3   Dose:  1-2 tablet   Quantity:  20 tablet        Take 1-2 tablets by mouth every 6 hours as needed for pain   Refills:  0        FOLIC ACID PO   Dose:  1 mg        Take 1 mg by mouth daily   Refills:  0        SERTRALINE HCL PO        Take by mouth daily   Refills:  0                Prescriptions were sent or printed at these locations (2 Prescriptions)                   Other Prescriptions                Printed at Department/Unit printer (2 of 2)         nitroFURantoin, macrocrystal-monohydrate, (MACROBID) 100 MG capsule               phenazopyridine (PYRIDIUM) 200 MG tablet                Procedures and tests performed during your visit     HCG qualitative urine    UA with Microscopic      Orders Needing Specimen Collection     None      Pending Results     No orders found for last 3 day(s).            Pending Culture Results     No orders found for last 3 day(s).            Pending Results Instructions     If you had any lab results that were not finalized at the time of your Discharge, you can call the ED Lab Result RN at [phone]. You will be contacted by this team for any positive  Lab results or changes in treatment. The nurses are available 7 days a week from 10A to 6:30P.  You can leave a message 24 hours per day and they will return your call.        Test Results From Your Hospital Stay        4/15/2018 11:41 PM      Component Results     Component Value Ref Range & Units Status    Color Urine Yellow  Final    Appearance Urine Slightly Cloudy  Final    Glucose Urine Negative NEG^Negative mg/dL Final    Bilirubin Urine Negative NEG^Negative Final    Ketones Urine Negative NEG^Negative mg/dL Final    Specific Gravity Urine 1.024 1.003 - 1.035 Final    Blood Urine Moderate (A) NEG^Negative Final    pH Urine 5.0 5.0 - 7.0 pH Final    Protein Albumin Urine 30 (A) NEG^Negative mg/dL Final    Urobilinogen mg/dL 0.0 0.0 - 2.0 mg/dL Final    Nitrite Urine Negative NEG^Negative Final    Leukocyte Esterase Urine Moderate (A) NEG^Negative Final    Source Midstream Urine  Final    WBC Urine 95 (H) 0 - 5 /HPF Final    RBC Urine 81 (H) 0 - 2 /HPF Final    Bacteria Urine Few (A) NEG^Negative /HPF Final    Squamous Epithelial /HPF Urine 6 (H) 0 - 1 /HPF Final    Mucous Urine Present (A) NEG^Negative /LPF Final         4/15/2018 11:40 PM      Component Results     Component Value Ref Range & Units Status    HCG Qual Urine Negative NEG^Negative Final    This test is for screening purposes.  Results should be interpreted along with   the clinical picture.  Confirmation testing is available if warranted by   ordering AFB239, HCG Quantitative Pregnancy.                  Clinical Quality Measure: Blood Pressure Screening     Your blood pressure was checked while you were in the emergency department today. The last reading we obtained was  BP: 125/75 . Please read the guidelines below about what these numbers mean and what you should do about them.  If your systolic blood pressure (the top number) is less than 120 and your diastolic blood pressure (the bottom number) is less than 80, then your blood pressure is  "normal. There is nothing more that you need to do about it.  If your systolic blood pressure (the top number) is 120-139 or your diastolic blood pressure (the bottom number) is 80-89, your blood pressure may be higher than it should be. You should have your blood pressure rechecked within a year by a primary care provider.  If your systolic blood pressure (the top number) is 140 or greater or your diastolic blood pressure (the bottom number) is 90 or greater, you may have high blood pressure. High blood pressure is treatable, but if left untreated over time it can put you at risk for heart attack, stroke, or kidney failure. You should have your blood pressure rechecked by a primary care provider within the next 4 weeks.  If your provider in the emergency department today gave you specific instructions to follow-up with your doctor or provider even sooner than that, you should follow that instruction and not wait for up to 4 weeks for your follow-up visit.        Thank you for choosing [facility]       Thank you for choosing [facility] for your care. Our goal is always to provide you with excellent care. Hearing back from our patients is one way we can continue to improve our services. Please take a few minutes to complete the written survey that you may receive in the mail after you visit with us. Thank you!        [facility] Information     [facility] lets you send messages to your doctor, view your test results, renew your prescriptions, schedule appointments and more. To sign up, go to [email or web address] . Click on \"Log in\" on the left side of the screen, which will take you to the Welcome page. Then click on \"Sign up Now\" on the right side of the page.     You will be asked to enter the access code listed below, as well as some personal information. Please follow the directions to create your username and password.     Your access code is: [email or web address]  Expires: 7/15/2018 12:53 AM     Your access code will  in " 90 days. If you need help or a new code, please call your [facility] clinic or [phone].        Care EveryWhere ID     This is your Care EveryWhere ID. This could be used by other organizations to access your [facility] medical records  [id number]        Equal Access to Services     VIRGIE MEDINA : Jeremie walters Soroberto, waaxda luqadaha, qaybta kaalmada meghna, yanelis goldman. So [facility] [phone].    ATENCIÓN: Si habla español, tiene a chan disposición servicios gratuitos de asistencia lingüística. Llame al [phone].    We comply with applicable federal civil rights laws and [place] laws. We do not discriminate on the basis of race, color, national origin, age, disability, sex, sexual orientation, or gender identity.            After Visit Summary       This is your record. Keep this with you and show to your community pharmacist(s) and doctor(s) at your next visit.

## 2018-04-15 NOTE — ED AVS SNAPSHOT
[facility] Emergency Department    [address]    [facility] [address]    Phone:  [phone]    Fax:  [phone]                                       Adrienne Guzman   MRN: [id number]    Department:  [facility] Emergency Department   Date of Visit:  4/15/2018           After Visit Summary Signature Page     I have received my discharge instructions, and my questions have been answered. I have discussed any challenges I see with this plan with the nurse or doctor.    ..........................................................................................................................................  Patient/Patient Representative Signature      ..........................................................................................................................................  Patient Representative Print Name and Relationship to Patient    ..................................................               ................................................  Date                                            Time    ..........................................................................................................................................  Reviewed by Signature/Title    ...................................................              ..............................................  Date                                                            Time

## 2018-04-16 VITALS
WEIGHT: 133 LBS | OXYGEN SATURATION: 99 % | BODY MASS INDEX: 23.56 KG/M2 | DIASTOLIC BLOOD PRESSURE: 84 MMHG | HEART RATE: 81 BPM | RESPIRATION RATE: 18 BRPM | SYSTOLIC BLOOD PRESSURE: 118 MMHG | TEMPERATURE: 98.5 F

## 2018-04-16 RX ORDER — PHENAZOPYRIDINE HYDROCHLORIDE 200 MG/1
200 TABLET, FILM COATED ORAL 3 TIMES DAILY
Qty: 9 TABLET | Refills: 0 | Status: SHIPPED | OUTPATIENT
Start: 2018-04-16 | End: 2018-04-19

## 2018-04-16 RX ORDER — NITROFURANTOIN 25; 75 MG/1; MG/1
100 CAPSULE ORAL 2 TIMES DAILY
Qty: 10 CAPSULE | Refills: 0 | Status: SHIPPED | OUTPATIENT
Start: 2018-04-16 | End: 2018-04-21

## 2018-04-16 ASSESSMENT — ENCOUNTER SYMPTOMS
DYSURIA: 1
FREQUENCY: 1
HEMATURIA: 1

## 2018-04-16 NOTE — ED NOTES
22-year-old female presents to the ER with complaints of problems voiding. States she started with frequency about 0200 today and now noticed some blood when she wipes.

## 2018-04-16 NOTE — ED PROVIDER NOTES
History     Chief Complaint:  Urinary Frequency    HPI   Adrienne Guzman is a 22 year old female who presents to the emergency department today for evaluation of urinary frequency. The patient report she woke up last night around 3 am with midline low abdominal pain. She then began having frequency with pain after urinating. She also noticed small pink dots in the toilet after urinating and pink spots on the toilet paper when she wiped, prompting her to present to the emergency department. The pain really only occurs with urination and is without alleviating factors. She denies back pain,abdominal surgical history, vaginal bleeding, vomiting or diarrhea.     Allergies:  No Known Drug Allergies    Medications:    Sertraline    Past Medical History:    History reviewed. No pertinent past medical history.    Past Surgical History:    History reviewed. No pertinent surgical history.    Family History:    History reviewed. No pertinent family history.     Social History:  The patient was alone in the emergency department.   Smoking Status: never  Smokeless Tobacco: never  Alcohol Use: no  Marital Status:  Single      Review of Systems   Genitourinary: Positive for dysuria, frequency and hematuria.   All other systems reviewed and are negative.    Physical Exam   First Vitals: /75  Pulse 99  Temp 98.5  F (36.9  C) (Temporal)  Resp 18  Wt 60.3 kg (133 lb)  SpO2 100%  BMI 23.56 kg/m2    Physical Exam    Constitutional:  Pleasant, age appropriate.       Resting comfortably in the bed.  Eyes:    Conjunctiva normal  Neck:    Supple, no meningismus.     CV:     Regular rate and rhythm.      No murmurs, rubs or gallops.  PULM:    Clear to auscultation bilateral.       No respiratory distress.      Good air exchange.  ABD:    Soft, non-distended.       Mild focal tenderness in the midline low abdomen.     Bowel sounds normal.     No pulsatile masses.       No rebound, guarding or rigidity.     No CVA  tenderness.   MSK:     No gross deformity to all four extremities.   LYMPH:   No cervical lymphadenopathy.  NEURO:   Alert.  Good muscular tone, no atrophy.   Skin:    Warm, dry and intact.    Psych:    Mood is good and affect is appropriate.    Emergency Department Course     Laboratory:  Laboratory findings were communicated with the patient who voiced understanding of the findings.    UA with Micro: Moderate blood in urine (A), protein albumin 30 (A), moderate leukocyte esterase (A), WBC/HPF 95 (H), RBC/HPF 81 (H), few bacteria (A), squamous epithelial 6 (H), mucous present (A).   HCG Qualitative: Negative    Interventions:  2342 Pyridium 200 mg PO     Emergency Department Course:  Nursing notes and vitals reviewed.  I performed an exam of the patient as documented above.   IV was inserted and blood was drawn for laboratory testing, results above.    0038 Patient rechecked and updated.     I personally reviewed the laboratory results with the Patient and answered all related questions prior to  Discharge.  Findings and plan explained to the Patient. Patient discharged home with instructions regarding supportive care, medications, and reasons to return. The importance of close follow-up was reviewed. The patient was prescribed pyridium and Macrobid.     Impression & Plan      Medical Decision Makin-year-old female presented to the emerge from with classic symptoms for acute cystitis.  She has no signs of pyelonephritis.  Urinalysis consistent with infection.  Patiently placed on a 5 day course of Macrobid and initiated on Pyridium to assist with discomfort.    Diagnosis:      1. Hemorrhagic cystitis     Disposition:  discharged to home    Discharge Medication List as of 2018 12:53 AM   START taking these medications    Details   nitroFURantoin, macrocrystal-monohydrate, (MACROBID) 100 MG capsule Take 1 capsule (100 mg) by mouth 2 times daily for 5 days, Disp-10 capsule, R-0, Local Print       phenazopyridine (PYRIDIUM) 200 MG tablet Take 1 tablet (200 mg) by mouth 3 times daily for 3 days, Disp-9 tablet, R-0, Local Print     Scribe Disclosure:  I, Shan Fine, am serving as a scribe at 11:29 PM on 4/15/2018 to document services personally performed by Juan Carlos Olvera MD based on my observations and the provider's statements to me.     4/15/2018   [facility] EMERGENCY DEPARTMENT       Juan Carlos Olvera MD  04/16/18 0138

## 2018-10-16 ENCOUNTER — HOSPITAL ENCOUNTER (OUTPATIENT)
Dept: ULTRASOUND IMAGING | Facility: CLINIC | Age: 23
Discharge: HOME | End: 2018-10-16
Admitting: RADIOLOGY

## 2018-10-16 DIAGNOSIS — Z30.431 SURVEILLANCE FOR BIRTH CONTROL, INTRAUTERINE DEVICE: ICD-10-CM

## 2018-10-16 PROCEDURE — 76856 US EXAM PELVIC COMPLETE: CPT

## 2020-01-24 ENCOUNTER — APPOINTMENT (OUTPATIENT)
Dept: CT IMAGING | Facility: CLINIC | Age: 25
End: 2020-01-24
Attending: PHYSICIAN ASSISTANT

## 2020-01-24 ENCOUNTER — HOSPITAL ENCOUNTER (EMERGENCY)
Facility: CLINIC | Age: 25
Discharge: HOME | End: 2020-01-24
Attending: PHYSICIAN ASSISTANT | Admitting: PHYSICIAN ASSISTANT

## 2020-01-24 ENCOUNTER — APPOINTMENT (OUTPATIENT)
Dept: GENERAL RADIOLOGY | Facility: CLINIC | Age: 25
End: 2020-01-24
Attending: PHYSICIAN ASSISTANT

## 2020-01-24 VITALS
TEMPERATURE: 97.6 F | DIASTOLIC BLOOD PRESSURE: 72 MMHG | OXYGEN SATURATION: 98 % | SYSTOLIC BLOOD PRESSURE: 136 MMHG | HEART RATE: 88 BPM | RESPIRATION RATE: 16 BRPM

## 2020-01-24 DIAGNOSIS — R07.89 CHEST WALL PAIN: ICD-10-CM

## 2020-01-24 DIAGNOSIS — R51.9 ACUTE NONINTRACTABLE HEADACHE, UNSPECIFIED HEADACHE TYPE: ICD-10-CM

## 2020-01-24 DIAGNOSIS — R06.02 SHORTNESS OF BREATH: ICD-10-CM

## 2020-01-24 DIAGNOSIS — M54.9 ACUTE BILATERAL BACK PAIN, UNSPECIFIED BACK LOCATION: ICD-10-CM
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
## 2020-01-24 PROCEDURE — 71046 X-RAY EXAM CHEST 2 VIEWS: CPT

## 2020-01-24 PROCEDURE — 25000132 ZZH RX MED GY IP 250 OP 250 PS 637: Performed by: PHYSICIAN ASSISTANT

## 2020-01-24 PROCEDURE — 99284 EMERGENCY DEPT VISIT MOD MDM: CPT | Mod: 25

## 2020-01-24 PROCEDURE — 70450 CT HEAD/BRAIN W/O DYE: CPT

## 2020-01-24 RX ORDER — CYCLOBENZAPRINE HCL 10 MG
10 TABLET ORAL 3 TIMES DAILY PRN
Qty: 20 TABLET | Refills: 0 | Status: SHIPPED | OUTPATIENT
Start: 2020-01-24 | End: 2020-01-30

## 2020-01-24 RX ORDER — OXYCODONE HYDROCHLORIDE 5 MG/1
5 TABLET ORAL ONCE
Status: COMPLETED | OUTPATIENT
Start: 2020-01-24 | End: 2020-01-24

## 2020-01-24 RX ORDER — IBUPROFEN 600 MG/1
600 TABLET, FILM COATED ORAL ONCE
Status: COMPLETED | OUTPATIENT
Start: 2020-01-24 | End: 2020-01-24

## 2020-01-24 RX ADMIN — OXYCODONE HYDROCHLORIDE 5 MG: 5 TABLET ORAL at 21:51

## 2020-01-24 RX ADMIN — IBUPROFEN 600 MG: 600 TABLET, FILM COATED ORAL at 21:51

## 2020-01-24 ASSESSMENT — ENCOUNTER SYMPTOMS
MYALGIAS: 1
NAUSEA: 1
DIZZINESS: 1
NECK PAIN: 1
SHORTNESS OF BREATH: 1
BACK PAIN: 1

## 2020-01-24 NOTE — ED AVS SNAPSHOT
[facility] Emergency Department  [address]  [facility] [address]  Phone:  [phone]  Fax:  [phone]                                    Adrienne Guzman   MRN: [id number]    Department:  [facility] Emergency Department   Date of Visit:  1/24/2020           After Visit Summary Signature Page    I have received my discharge instructions, and my questions have been answered. I have discussed any challenges I see with this plan with the nurse or doctor.    ..........................................................................................................................................  Patient/Patient Representative Signature      ..........................................................................................................................................  Patient Representative Print Name and Relationship to Patient    ..................................................               ................................................  Date                                   Time    ..........................................................................................................................................  Reviewed by Signature/Title    ...................................................              ..............................................  Date                                               Time          22EPIC Rev 08/18

## 2020-01-25 NOTE — ED PROVIDER NOTES
"  History     Chief Complaint:    Complications from a  Fall    HPI   Adrienne Guzman is a 24 year old female who presents with complications from a fall. The patient reports to have been shoveling snow yesterday at 1100 when the shovel got stuck and struck her in the abdomen causing her to fall over and resulted in the \"wind knocked out of her\". She reports to have fallen onto her left side and head but notes that she has generalized pain throughout her entire body with most of the pain focalized in her ribs. She notes that since the fall she has experienced shortness of breath, nausea, dizziness, drowsiness, and visual disturbances.    Allergies:  No Known Drug Allergies    Medications:    Tylenol/Codeine   Sertaline HCL    Past Medical History:    Anemia    Past Surgical History:    Surgical history reviewed. No pertinent surgical history.    Family History:    Family history reviewed. No pertinent family history.    Social History:  The patient was unaccompanied to the ED.  Smoking Status: Never Smoker  Smokeless Tobacco: Never Used  Alcohol Use: Negative   Drug Use: Negative  PCP: Katarina Fitzpatrick  Marital Status:  Single        Review of Systems   Eyes: Positive for visual disturbance.   Respiratory: Positive for shortness of breath.    Gastrointestinal: Positive for nausea.   Musculoskeletal: Positive for back pain, myalgias and neck pain.        Focalized pain to ribs     Neurological: Positive for dizziness. Negative for syncope.        Drowsiness     All other systems reviewed and are negative.      Physical Exam   Vitals:  Patient Vitals for the past 24 hrs:   BP Temp Temp src Pulse Resp SpO2   01/24/20 2301 136/72 97.6  F (36.4  C) Temporal 88 16 98 %       Physical Exam  Constitutional: Pleasant. Cooperative.   Eyes: Pupils equally round and reactive  HENT: No scalp hematoma. No scalp tenderness. No bony step-off or crepitus. No facial bone tenderness or instability. No hemotympanum. No " periorbital ecchymosis or Carrasco signs. Oropharynx is normal with moist mucus membranes. No evidence for intraoral injury.  Cardiovascular: Regular rate and rhythm and without murmurs.  Respiratory: Normal respiratory effort, lungs are clear bilaterally.  GI: Mild tenderness to epigastrium/lower sternum. Soft. Non-distended. No guarding, rebound, or rigidity.  Musculoskeletal: No midline cervical, thoracic, or lumbar tenderness. Paraspinal tenderness throughout spinal column. Normal painless ROM of the neck. Tenderness to trapezius bilaterally. No clavicular tenderness. Mild tenderness to palpation to bilateral upper extremities throughout, L>R. No focal area of tenderness. Mild tenderness to palpation of L lower extremity throughout, no focal area of tenderness. R lower extremity normal. Normal ROM of extremities. Mild tenderness to palpation of bilateral lower rib cage.  Skin: No rashes. No lacerations or abrasions noted.  Neurologic: Cranial nerves II-XII intact, normal cognition, no focal deficits. Alert and oriented x 3. Normal  strength. Normal leg raise. Sensation to light touch intact throughout all 4 extremities. 5/5 strength with dorsiflexion and plantarflexion bilaterally. GCS 15  Psychiatric: Normal affect.  Nursing notes and vital signs reviewed.    Emergency Department Course     Imaging:  Radiology findings were communicated with the patient who voiced understanding of the findings.    XR Chest 2 Views   Preliminary Result   IMPRESSION: No evidence of active cardiopulmonary disease.       CT Head w/o Contrast   Final Result   IMPRESSION:   1.  Normal head CT.        Interventions:  2151 Oxycodone 5 mg PO  215 Advil 600 mg PO    Emergency Department Course:    2119 Nursing notes and vitals reviewed.    2135 I performed an exam of the patient as documented above.     I ordered the above imaging. Patient provided the above interventions.    Discussed results with patient.    Patient discharged to  home.    Impression & Plan     Medical Decision Making:  Adrienne Guzman is a 24 year old female who presents to the ED following a fall.  Patient hit her head but had no LOC.  Here she complains of pain to her entire body, however mostly to her left side and her bilateral lower rib cage.  She reports associated dyspnea as well.  See HPI as above for additional details.  Vitals and physical exam as above.  Differential includes intracranial hemorrhage, pneumothorax, rib fracture, spinal fracture, fracture to extremities, intrathoracic and intra-abdominal, among others.  The above work-up was obtained.  Discussed with patient indications for head CT and that she could likely be cleared clinically.  Ultimately, patient requested and was provided head CT which revealed no intracranial abnormality.  Given dyspnea and chest wall pain, x-ray of chest obtained without acute abnormality.  Patient provided the above interventions with only moderate improvement of her symptoms.  Discussed with the patient that her symptoms will likely improve with time and are likely secondary to the fall however there is no indication for admission to the hospital or the OR at this time.  Advised close outpatient follow-up with PCP with persistent symptoms.  Discussed reasons to return.  Patient provided Rx for Flexeril for muscle spasms.  All questions answered.  Patient discharged home in stable condition.      Diagnosis:    ICD-10-CM    1. Fall, initial encounter W19.XXXA    2. Chest wall pain R07.89    3. Acute bilateral back pain, unspecified back location M54.9    4. Acute nonintractable headache, unspecified headache type R51    5. Shortness of breath R06.02        Disposition:  Home    Discharge Medications:  Discharge Medication List as of 1/24/2020 10:58 PM      START taking these medications    Details   cyclobenzaprine (FLEXERIL) 10 MG tablet Take 1 tablet (10 mg) by mouth 3 times daily as needed for muscle spasms,  Disp-20 tablet, R-0, Local Print             Scribe Disclosure:  I, Felice Braynt, am serving as a scribe at 9:43 PM on 1/24/2020 to document services personally performed by Justin Villarreal PA-C based on my observations and the provider's statements to me.     [facility] EMERGENCY DEPARTMENT       Justin Villarreal PA-C  01/24/20 4776

## 2020-01-25 NOTE — ED TRIAGE NOTES
Pt reports falling while shoveling snow and striking head and right chest, rib and chest pain, feels like she can't get a good breath in. Headache and dizziness.

## 2020-01-25 NOTE — DISCHARGE INSTRUCTIONS
Flexeril is sedating. Do not drive after taking.  Use Tylenol and ibuprofen for pain.    Discharge Instructions  Back Pain  You were seen today for back pain. Back pain can have many causes, but most will get better without surgery or other specific treatment. Sometimes there is a herniated ( slipped ) disc. We do not usually do MRI scans to look for these right away, since most herniated discs will get better on their own with time.  Today, we did not find any evidence that your back pain was caused by a serious condition. However, sometimes symptoms develop over time and cannot be found during an emergency visit, so it is very important that you follow up with your primary provider.  Generally, every Emergency Department visit should have a follow-up clinic visit with either a primary or a specialty clinic/provider. Please follow-up as instructed by your emergency provider today.    Return to the Emergency Department if:  You develop a fever with your back pain.   You have weakness or change in sensation in one or both legs.  You lose control of your bowels or bladder, or cannot empty your bladder (cannot pee).  Your pain gets much worse.     Follow-up with your provider:  Unless your pain has completely gone away, please make an appointment with your provider within one week. Most of the routine care for back pain is available in a clinic and not the Emergency Department. You may need further management of your back pain, such as more pain medication, imaging such as an X-ray or MRI, or physical therapy.    What can I do to help myself?  Remain Active -- People are often afraid that they will hurt their back further or delay recovery by remaining active, but this is one of the best things you can do for your back. In fact, staying in bed for a long time to rest is not recommended. Studies have shown that people with low back pain recover faster when they remain active. Movement helps to bring blood flow to the  muscles and relieve muscle spasms as well as preventing loss of muscle strength.  Heat -- Using a heating pad can help with low back pain during the first few weeks. Do not sleep with a heating pad, as you can be burned.   Pain medications - You may take a pain medication such as Tylenol  (acetaminophen), Advil , Motrin  (ibuprofen) or Aleve  (naproxen).  If you were given a prescription for medicine here today, be sure to read all of the information (including the package insert) that comes with your prescription.  This will include important information about the medicine, its side effects, and any warnings that you need to know about.  The pharmacist who fills the prescription can provide more information and answer questions you may have about the medicine.  If you have questions or concerns that the pharmacist cannot address, please call or return to the Emergency Department.   Remember that you can always come back to the Emergency Department if you are not able to see your regular provider in the amount of time listed above, if you get any new symptoms, or if there is anything that worries you.

## 2020-11-23 ENCOUNTER — HOSPITAL ENCOUNTER (EMERGENCY)
Facility: CLINIC | Age: 25
Discharge: HOME | End: 2020-11-23
Attending: EMERGENCY MEDICINE | Admitting: EMERGENCY MEDICINE

## 2020-11-23 ENCOUNTER — APPOINTMENT (OUTPATIENT)
Dept: ULTRASOUND IMAGING | Facility: CLINIC | Age: 25
End: 2020-11-23
Attending: EMERGENCY MEDICINE

## 2020-11-23 ENCOUNTER — APPOINTMENT (OUTPATIENT)
Dept: GENERAL RADIOLOGY | Facility: CLINIC | Age: 25
End: 2020-11-23
Attending: EMERGENCY MEDICINE

## 2020-11-23 VITALS
DIASTOLIC BLOOD PRESSURE: 77 MMHG | RESPIRATION RATE: 16 BRPM | TEMPERATURE: 98.2 F | HEART RATE: 65 BPM | WEIGHT: 160 LBS | OXYGEN SATURATION: 99 % | BODY MASS INDEX: 28.34 KG/M2 | SYSTOLIC BLOOD PRESSURE: 121 MMHG

## 2020-11-23 DIAGNOSIS — K80.50 BILIARY COLIC: ICD-10-CM

## 2020-11-23 LAB
ALBUMIN SERPL-MCNC: 3.3 G/DL (ref 3.4–5)
ALP SERPL-CCNC: 97 U/L (ref 40–150)
ALT SERPL W P-5'-P-CCNC: 29 U/L (ref 0–50)
ANION GAP SERPL CALCULATED.3IONS-SCNC: 4 MMOL/L (ref 3–14)
AST SERPL W P-5'-P-CCNC: 23 U/L (ref 0–45)
B-HCG FREE SERPL-ACNC: <5 IU/L
BASOPHILS # BLD AUTO: 0 10E9/L (ref 0–0.2)
BASOPHILS NFR BLD AUTO: 0.4 %
BILIRUB SERPL-MCNC: 0.5 MG/DL (ref 0.2–1.3)
BUN SERPL-MCNC: 10 MG/DL (ref 7–30)
CALCIUM SERPL-MCNC: 9.7 MG/DL (ref 8.5–10.1)
CHLORIDE SERPL-SCNC: 106 MMOL/L (ref 94–109)
CO2 SERPL-SCNC: 28 MMOL/L (ref 20–32)
CREAT SERPL-MCNC: 0.49 MG/DL (ref 0.52–1.04)
DIFFERENTIAL METHOD BLD: NORMAL
EOSINOPHIL # BLD AUTO: 0.1 10E9/L (ref 0–0.7)
EOSINOPHIL NFR BLD AUTO: 0.6 %
ERYTHROCYTE [DISTWIDTH] IN BLOOD BY AUTOMATED COUNT: 12.9 % (ref 10–15)
GFR SERPL CREATININE-BSD FRML MDRD: >90 ML/MIN/{1.73_M2}
GLUCOSE SERPL-MCNC: 94 MG/DL (ref 70–99)
HCT VFR BLD AUTO: 46.6 % (ref 35–47)
HGB BLD-MCNC: 14.7 G/DL (ref 11.7–15.7)
IMM GRANULOCYTES # BLD: 0 10E9/L (ref 0–0.4)
IMM GRANULOCYTES NFR BLD: 0.3 %
INTERPRETATION ECG - MUSE: NORMAL
LIPASE SERPL-CCNC: 86 U/L (ref 73–393)
LYMPHOCYTES # BLD AUTO: 2.5 10E9/L (ref 0.8–5.3)
LYMPHOCYTES NFR BLD AUTO: 25.3 %
MCH RBC QN AUTO: 29.7 PG (ref 26.5–33)
MCHC RBC AUTO-ENTMCNC: 31.5 G/DL (ref 31.5–36.5)
MCV RBC AUTO: 94 FL (ref 78–100)
MONOCYTES # BLD AUTO: 1 10E9/L (ref 0–1.3)
MONOCYTES NFR BLD AUTO: 9.9 %
NEUTROPHILS # BLD AUTO: 6.2 10E9/L (ref 1.6–8.3)
NEUTROPHILS NFR BLD AUTO: 63.5 %
NRBC # BLD AUTO: 0 10*3/UL
NRBC BLD AUTO-RTO: 0 /100
PLATELET # BLD AUTO: 272 10E9/L (ref 150–450)
POTASSIUM SERPL-SCNC: 3.7 MMOL/L (ref 3.4–5.3)
PROT SERPL-MCNC: 7.7 G/DL (ref 6.8–8.8)
RBC # BLD AUTO: 4.95 10E12/L (ref 3.8–5.2)
SODIUM SERPL-SCNC: 138 MMOL/L (ref 133–144)
TROPONIN I SERPL-MCNC: <0.015 UG/L (ref 0–0.04)
WBC # BLD AUTO: 9.8 10E9/L (ref 4–11)

## 2020-11-23 PROCEDURE — 85025 COMPLETE CBC W/AUTO DIFF WBC: CPT | Performed by: EMERGENCY MEDICINE

## 2020-11-23 PROCEDURE — 71046 X-RAY EXAM CHEST 2 VIEWS: CPT

## 2020-11-23 PROCEDURE — 250N000009 HC RX 250: Performed by: EMERGENCY MEDICINE

## 2020-11-23 PROCEDURE — 84702 CHORIONIC GONADOTROPIN TEST: CPT

## 2020-11-23 PROCEDURE — 76705 ECHO EXAM OF ABDOMEN: CPT

## 2020-11-23 PROCEDURE — 250N000013 HC RX MED GY IP 250 OP 250 PS 637: Performed by: EMERGENCY MEDICINE

## 2020-11-23 PROCEDURE — 83690 ASSAY OF LIPASE: CPT | Performed by: EMERGENCY MEDICINE

## 2020-11-23 PROCEDURE — 99285 EMERGENCY DEPT VISIT HI MDM: CPT | Mod: 25

## 2020-11-23 PROCEDURE — 84484 ASSAY OF TROPONIN QUANT: CPT | Performed by: EMERGENCY MEDICINE

## 2020-11-23 PROCEDURE — 93005 ELECTROCARDIOGRAM TRACING: CPT

## 2020-11-23 PROCEDURE — 80053 COMPREHEN METABOLIC PANEL: CPT | Performed by: EMERGENCY MEDICINE

## 2020-11-23 RX ORDER — HYDROCODONE BITARTRATE AND ACETAMINOPHEN 5; 325 MG/1; MG/1
1-2 TABLET ORAL EVERY 6 HOURS PRN
Qty: 4 TABLET | Refills: 0 | Status: SHIPPED | OUTPATIENT
Start: 2020-11-23 | End: 2020-11-26

## 2020-11-23 RX ORDER — ONDANSETRON 4 MG/1
4 TABLET, ORALLY DISINTEGRATING ORAL EVERY 8 HOURS PRN
Qty: 6 TABLET | Refills: 0 | Status: SHIPPED | OUTPATIENT
Start: 2020-11-23 | End: 2020-11-26

## 2020-11-23 RX ORDER — FAMOTIDINE 20 MG/1
20 TABLET, FILM COATED ORAL 2 TIMES DAILY
Qty: 14 TABLET | Refills: 0 | Status: SHIPPED | OUTPATIENT
Start: 2020-11-23 | End: 2020-11-30

## 2020-11-23 RX ORDER — FAMOTIDINE 20 MG/1
20 TABLET, FILM COATED ORAL ONCE
Status: COMPLETED | OUTPATIENT
Start: 2020-11-23 | End: 2020-11-23

## 2020-11-23 RX ADMIN — LIDOCAINE HYDROCHLORIDE 30 ML: 20 SOLUTION ORAL; TOPICAL at 11:26

## 2020-11-23 RX ADMIN — FAMOTIDINE 20 MG: 20 TABLET ORAL at 11:26

## 2020-11-23 ASSESSMENT — ENCOUNTER SYMPTOMS
ABDOMINAL PAIN: 0
DYSURIA: 0
LIGHT-HEADEDNESS: 1
DIFFICULTY URINATING: 0
DIZZINESS: 1
NAUSEA: 1

## 2020-11-23 NOTE — ED TRIAGE NOTES
"Patient reports 2 episodes of chest pain, one during the night that lasted a few minutes and another that occurred at about 0900 and lasted an hour. The pain is epigastric, no radiation, achy, \"like I was punched\". Accompanied by \"feeling like I need to use the rest room and (BM) and also like I want to throw up\".  "

## 2020-11-23 NOTE — ED PROVIDER NOTES
History     Chief Complaint:  Chest Pain    HPI   Adrienne Guzman is a 25 year old female who presents with chest pain. The patient reports that last night she woke up to chest pain, epigastric pain, nausea, and dizziness for a few minutes. Then, this morning she had another chest pain episode that began 2 hours ago and lasted an hour, and she couldn't sit up or stand up straight. Her chest is sore and this radiates into her upper back. She reports shortness of breath during the episodes, but it does not hurt to take deep breaths. No dysuria or trouble urinating. She has no abdominal pain. No falls or injuries reported. Of note, she drank a little more alcohol than usual on this previous Saturday night.    Allergies:  No known drug allergies     Medications:    Sertraline    Past Medical History:    IUD complication  Postpartum hemorrhage  Anemia  Anxious depression    Past Surgical History:    History reviewed. No pertinent surgical history.    Family History:    History reviewed. No pertinent family history.     Social History:  Smoking Status: Never Smoker  Smokeless Tobacco: Never Used  Alcohol Use: Positive  Drug Use: Negative  PCP: Katarina Fitzpatrick  Marital Status: Single     Review of Systems   Cardiovascular: Positive for chest pain.   Gastrointestinal: Positive for nausea. Negative for abdominal pain.   Genitourinary: Negative for difficulty urinating and dysuria.   Neurological: Positive for dizziness and light-headedness.   All other systems reviewed and are negative.      Physical Exam     Patient Vitals for the past 24 hrs:   BP Temp Temp src Pulse Resp SpO2 Weight   11/23/20 1300 108/64 -- -- 65 -- 99 % --   11/23/20 1245 114/79 -- -- 73 -- 100 % --   11/23/20 1215 -- -- -- 96 -- 100 % --   11/23/20 1200 126/83 -- -- 73 -- -- --   11/23/20 1145 116/79 -- -- 84 -- -- --   11/23/20 1130 122/81 -- -- 96 -- 99 % --   11/23/20 1115 -- -- -- 85 -- 99 % --   11/23/20 1100 119/65 -- -- 71 -- 99 % --    11/23/20 1045 123/77 -- -- 85 -- 99 % --   11/23/20 1022 112/78 98.2  F (36.8  C) Oral 92 16 99 % 72.6 kg (160 lb)       Physical Exam  Gen: alert  HEENT: PERRL, oropharynx clear  Neck: normal ROM  CV: RRR, no murmurs, 2+ distal pulses in all 4 extremities  Chest: no tenderness over the chest wall  Pulm: breath sounds equal, lungs clear  Abd: Soft, mild epigastric tenderness  Back: no evidence of injury  MSK: no lower extremity edema, no calf tenderness  Skin: no rash  Neuro: alert, appropriate conversation and interaction    Emergency Department Course     ECG:  ECG taken at 1102, ECG read at 115  Normal sinus rhythm with sinus arrhythmia  Normal ECG  Rate 71 bpm. NV interval 134 ms. QRS duration 78 ms. QT/QTc 372/404 ms. P-R-T axes 53 54 39.    Imaging:  Radiology findings were communicated with the patient who voiced understanding of the findings.    Chest XR,  PA & LAT  Negative chest. Lungs clear.  Reading per radiology    Abdomen US, limited (RUQ only)  1.  Gallbladder is filled with gallstones. No gallbladder wall  thickening or biliary dilatation.  Reading per radiology    Laboratory:  Laboratory findings were communicated with the patient who voiced understanding of the findings.    CBC: WBC 9.8, HGB 14.7,   CMP: Creatinine 0.49(L), Albumin 3.3(L) o/w WNL  Lipase: 86  Troponin (Collected 1130): <0.015  ISTAT HCG Quantitative Pregnancy POCT: <5.0    Interventions:  1126 Pepcid 20 mg oral  1126 GI Cocktail - Maalox 15 mL, Viscous Lidocaine 15 mL, 30 mL suspension PO    Emergency Department Course:    1034 Nursing notes and vitals reviewed.    1050 I performed an exam of the patient as documented above.     1318 I discussed test results with patient as documented above.    Findings and plan explained to the Patient. Patient discharged home with instructions regarding supportive care, medications, and reasons to return. The importance of close follow-up was reviewed. The patient was prescribed Pepcid,  Norco, and Zofran.    Impression & Plan      Medical Decision Making:  The patient presents for acute onset upper abdominal pain.  Evaluation today is consistent with cholelithiasis.  Based on the location and nature of the patient's pain I feel that biliary colic is the cause of the pain.  There is no evidence of cholecystitis on ultrasound.  Mildly elevated AST is nonspecifica and other LFT and lipase wnl.  There was no evidence of biliary obstruction or choledocholithiasis on ultrasound.  Aggressive attempts were made to control the patient's pain as documented above.  The patient's pain was resolved with these measures.  I thoroughly reexamined that patient's abdomen and there was no tenderness.  Given the resolution of the pain and the lack of findings suggestive of infection, I feel that the patient is safe for discharge and ongoing outpatient evaluation.  I discussed the finding of gallstones with the patient.  I recommended outpatient surgical consultation for discussion of the risks and benefits of cholecystectomy.  I discussed the need to return to the emergency department for return of severe pain, fever or vomiting.  The patient expressed good understanding.    Diagnosis:    ICD-10-CM    1. Biliary colic  K80.50      Disposition:   Patient was discharged to home.     Discharge Medications:  New Prescriptions    FAMOTIDINE (PEPCID) 20 MG TABLET    Take 1 tablet (20 mg) by mouth 2 times daily for 7 days    HYDROCODONE-ACETAMINOPHEN (NORCO) 5-325 MG TABLET    Take 1-2 tablets by mouth every 6 hours as needed for pain    ONDANSETRON (ZOFRAN ODT) 4 MG ODT TAB    Take 1 tablet (4 mg) by mouth every 8 hours as needed for nausea or vomiting       Scribe Disclosure:  Gary GARCIAS, am serving as a scribe at 10:50 AM on 11/23/2020 to document services personally performed by Margret Mccarthy MD based on my observations and the provider's statements to me.    Scribe Disclosure:  Lizy GARCIAS, am  serving as a scribe at 1:45 PM on 11/23/2020 to document services personally performed by Margret Mccarthy MD based on my observations and the provider's statements to me.     [facility] EMERGENCY DEPT       Margret Mccarthy MD  11/23/20 2036

## 2020-11-23 NOTE — DISCHARGE INSTRUCTIONS
Take pepcid 20 mg 2x per day for 5-7 days.    Discharge Instructions  Biliary Colic    You have been seen today for biliary colic. Biliary colic is the pain that happens when gallstones block the normal flow of bile from the gallbladder.  It usually is a steady or crampy pain in the upper abdomen (belly), most often under the right side of the rib cage where the gallbladder is. Sometimes you get pain from the gallbladder in your back or shoulder. It is common to have nausea (sick to stomach) and vomiting (throwing up) with biliary colic.    Bile is a liquid the body makes to help with digesting fat.  It is made by the liver and stored in the gallbladder and released from the gall bladder when you eat fatty foods. Gallstones can form for a variety of reasons. Risk factors for gallstones include being female, having a family history of gallstones, being older, being pregnant or having been pregnant, having diabetes, having rapid weight loss, and others.      Once gallstones form, surgeons usually tell you to have your gallbladder removed. There is medicine that can dissolve gallstones, but it can be unpleasant to take, and gallstones tend to come back when you quit taking the medicine. Your regular provider can help decide on the right treatment for you, and may refer you to a surgeon to discuss whether surgery is right in your case.     Complications of gallstones include infection, jaundice, inflammation of the pancreas, and rupture of the gallbladder. One of these complications will happen to about one out of every four patients with gallstones over the next 10-20 years if they are not treated.       Generally, every Emergency Department visit should have a follow-up clinic visit with either a primary or a specialty clinic/provider. Please follow-up as instructed by your emergency provider today.    Return to the Emergency Department if you develop:  Fever greater than 100.5 F.   Persistent nausea and  vomiting.  Pain that will not go away with the medicines you were given here.  Yellow skin or eye color (jaundice).  Other new concerning symptoms.    What can I do to help myself?  Eat regular meals at least three times a day, to make the gallbladder empty before it gets too full.  Avoid fried or fatty foods.  Drink plenty of clear fluids.  Take over-the-counter or prescribed pain medications as recommended by your provider.      If you were given a prescription for medicine here today, be sure to read all of the information (including the package insert) that comes with your prescription.  This will include important information about the medicine, its side effects, and any warnings that you need to know about.  The pharmacist who fills the prescription can provide more information and answer questions you may have about the medicine.  If you have questions or concerns that the pharmacist cannot address, please call or return to the Emergency Department.     Remember that you can always come back to the Emergency Department if you are not able to see your regular provider in the amount of time listed above, if you get any new symptoms, or if there is anything that worries you.

## 2020-11-23 NOTE — ED AVS SNAPSHOT
[facility] Emergency Dept  [address]  [facility] [address]  Phone: [phone]  Fax: [phone]                                    Adrienne Guzman   MRN: [id number]    Department: [facility] Emergency Dept   Date of Visit: 11/23/2020           After Visit Summary Signature Page    I have received my discharge instructions, and my questions have been answered. I have discussed any challenges I see with this plan with the nurse or doctor.    ..........................................................................................................................................  Patient/Patient Representative Signature      ..........................................................................................................................................  Patient Representative Print Name and Relationship to Patient    ..................................................               ................................................  Date                                   Time    ..........................................................................................................................................  Reviewed by Signature/Title    ...................................................              ..............................................  Date                                               Time          22EPIC Rev 08/18

## 2020-11-30 ENCOUNTER — VIRTUAL VISIT (OUTPATIENT)
Dept: SURGERY | Facility: CLINIC | Age: 25
End: 2020-11-30

## 2020-11-30 VITALS — WEIGHT: 160 LBS | BODY MASS INDEX: 29.44 KG/M2 | HEIGHT: 62 IN

## 2020-11-30 DIAGNOSIS — K80.20 GALLSTONES: Primary | ICD-10-CM

## 2020-11-30 PROCEDURE — 99442 PR PHYSICIAN TELEPHONE EVALUATION 11-20 MIN: CPT | Mod: 95 | Performed by: SURGERY

## 2020-11-30 ASSESSMENT — MIFFLIN-ST. JEOR: SCORE: 1419.01

## 2020-11-30 NOTE — LETTER
[facility]    [address], [address]  [place], [place] [address]  Phone [phone]  Fax [phone] [address], [address]  [place] [address]  Phone [phone]  Fax [phone]    [email or web address]   2020       Re: Adrienne LOZANO 1995    Abdominal pain, epigastric     HPI:  This patient is a 25 year old female who presents with recent episode of severe epigastric pain.  She was seen in the emergency room where she was diagnosed with gallstones and presumed biliary colic.  This was the worst attack she had, but the patient has noticed intermittent similar symptoms over the last year.  Typically, these have lasted about 5 minutes.  This most recent attack lasted about an hour.     Past Medical History:  IUD complication  Postpartum hemorrhage  Anemia  Anxious depression     Past Surgical History: Laparoscopic removal of displaced IUD - 2020.           Family History: No pertinent family history     Review of Systems: The 10 point Review of Systems is negative other than noted in the HPI and above.     Physical Exam: No physical examination is done as this is a telephone visit.     Relevant labs: Normal liver function tests.     Imaging: Gallbladder filled with gallstones.  No evidence of wall thickening or ductal dilatation.     Assessment and Plan: This is a patient with a recent attack of epigastric pain consistent with biliary colic.  I have recommended laparoscopic cholecystectomy.  We discussed the procedure, along with its risks and complications, in detail. The patient would like to proceed.  She has to work out some issues regarding insurance, and will call us when she is ready to schedule surgery.  I have recommended a low-fat diet in the interim.  If she has severe return of symptoms, she should return to the emergency room.     Juaquin Daniel MD  [facility]

## 2020-11-30 NOTE — PROGRESS NOTES
"Adrienne Guzman is a 25 year old female who is being evaluated via a billable telephone visit.      The patient has been notified of following:     \"This telephone visit will be conducted via a call between you and your physician/provider. We have found that certain health care needs can be provided without the need for a physical exam.  This service lets us provide the care you need with a short phone conversation.  If a prescription is necessary we can send it directly to your pharmacy.  If lab work is needed we can place an order for that and you can then stop by our lab to have the test done at a later time.    Telephone visits are billed at different rates depending on your insurance coverage. During this emergency period, for some insurers they may be billed the same as an in-person visit.  Please reach out to your insurance provider with any questions.    If during the course of the call the physician/provider feels a telephone visit is not appropriate, you will not be charged for this service.\"    Patient has given verbal consent for Telephone visit?  Yes    What phone number would you like to be contacted at?[phone]    Chief complaint:  Abdominal pain, epigastric    HPI:  This patient is a 25 year old female who presents with recent episode of severe epigastric pain.  She was seen in the emergency room where she was diagnosed with gallstones and presumed biliary colic.  This was the worst attack she had, but the patient has noticed intermittent similar symptoms over the last year.  Typically, these have lasted about 5 minutes.  This most recent attack lasted about an hour.    Past Medical History:  IUD complication  Postpartum hemorrhage  Anemia  Anxious depression    Past Surgical History:  Laparoscopic removal of displaced IUD - October 2020.     Social History:  Social History     Socioeconomic History     Marital status: Single     Spouse name: Not on file     Number of children: Not on " file     Years of education: Not on file     Highest education level: Not on file   Occupational History     Not on file   Social Needs     Financial resource strain: Not on file     Food insecurity     Worry: Not on file     Inability: Not on file     Transportation needs     Medical: Not on file     Non-medical: Not on file   Tobacco Use     Smoking status: Never Smoker     Smokeless tobacco: Never Used   Substance and Sexual Activity     Alcohol use: No     Drug use: No     Sexual activity: Yes   Lifestyle     Physical activity     Days per week: Not on file     Minutes per session: Not on file     Stress: Not on file   Relationships     Social connections     Talks on phone: Not on file     Gets together: Not on file     Attends [employer] service: Not on file     Active member of club or organization: Not on file     Attends meetings of clubs or organizations: Not on file     Relationship status: Not on file     Intimate partner violence     Fear of current or ex partner: Not on file     Emotionally abused: Not on file     Physically abused: Not on file     Forced sexual activity: Not on file   Other Topics Concern     Not on file   Social History Narrative     Not on file        Family History:  No pertinent family history    Review of Systems:  The 10 point Review of Systems is negative other than noted in the HPI and above.    Physical Exam:  No physical examination is done as this is a telephone visit.    Relevant labs:  Normal liver function tests.    Imaging:  Gallbladder filled with gallstones.  No evidence of wall thickening or ductal dilatation.    Assessment and Plan:  This is a patient with a recent attack of epigastric pain consistent with biliary colic.  I have recommended laparoscopic cholecystectomy.  We discussed the procedure, along with its risks and complications, in detail.  The patient would like to proceed.  She has to work out some issues regarding insurance, and will call us when she is  ready to schedule surgery.  I have recommended a low-fat diet in the interim.  If she has severe return of symptoms, she should return to the emergency room.    Juaquin Daniel MD  [facility]    Please route or send letter to:  Primary Care Provider (PCP)      Phone call duration: 23 minutes    Juaquin Daniel MD

## 2020-12-30 ENCOUNTER — HOSPITAL ENCOUNTER (EMERGENCY)
Facility: CLINIC | Age: 25
Discharge: HOME | End: 2020-12-31
Attending: EMERGENCY MEDICINE | Admitting: EMERGENCY MEDICINE

## 2020-12-30 DIAGNOSIS — O36.80X0 PREGNANCY OF UNKNOWN ANATOMIC LOCATION: ICD-10-CM

## 2020-12-30 DIAGNOSIS — O26.899 PELVIC PAIN IN PREGNANCY: ICD-10-CM

## 2020-12-30 DIAGNOSIS — R10.2 PELVIC PAIN IN PREGNANCY: ICD-10-CM

## 2020-12-30 LAB
ALBUMIN SERPL-MCNC: 3.4 G/DL (ref 3.4–5)
ALP SERPL-CCNC: 81 U/L (ref 40–150)
ALT SERPL W P-5'-P-CCNC: 27 U/L (ref 0–50)
ANION GAP SERPL CALCULATED.3IONS-SCNC: 2 MMOL/L (ref 3–14)
AST SERPL W P-5'-P-CCNC: 16 U/L (ref 0–45)
B-HCG SERPL-ACNC: 242 IU/L (ref 0–5)
BASOPHILS # BLD AUTO: 0 10E9/L (ref 0–0.2)
BASOPHILS NFR BLD AUTO: 0.4 %
BILIRUB SERPL-MCNC: 0.2 MG/DL (ref 0.2–1.3)
BUN SERPL-MCNC: 9 MG/DL (ref 7–30)
CALCIUM SERPL-MCNC: 9.2 MG/DL (ref 8.5–10.1)
CHLORIDE SERPL-SCNC: 109 MMOL/L (ref 94–109)
CO2 SERPL-SCNC: 29 MMOL/L (ref 20–32)
CREAT SERPL-MCNC: 0.48 MG/DL (ref 0.52–1.04)
DIFFERENTIAL METHOD BLD: NORMAL
EOSINOPHIL # BLD AUTO: 0 10E9/L (ref 0–0.7)
EOSINOPHIL NFR BLD AUTO: 0.5 %
ERYTHROCYTE [DISTWIDTH] IN BLOOD BY AUTOMATED COUNT: 13.4 % (ref 10–15)
GFR SERPL CREATININE-BSD FRML MDRD: >90 ML/MIN/{1.73_M2}
GLUCOSE SERPL-MCNC: 86 MG/DL (ref 70–99)
HCT VFR BLD AUTO: 43.8 % (ref 35–47)
HGB BLD-MCNC: 14.1 G/DL (ref 11.7–15.7)
IMM GRANULOCYTES # BLD: 0 10E9/L (ref 0–0.4)
IMM GRANULOCYTES NFR BLD: 0.2 %
LIPASE SERPL-CCNC: 121 U/L (ref 73–393)
LYMPHOCYTES # BLD AUTO: 2.7 10E9/L (ref 0.8–5.3)
LYMPHOCYTES NFR BLD AUTO: 32.7 %
MCH RBC QN AUTO: 30.5 PG (ref 26.5–33)
MCHC RBC AUTO-ENTMCNC: 32.2 G/DL (ref 31.5–36.5)
MCV RBC AUTO: 95 FL (ref 78–100)
MONOCYTES # BLD AUTO: 0.9 10E9/L (ref 0–1.3)
MONOCYTES NFR BLD AUTO: 10.9 %
NEUTROPHILS # BLD AUTO: 4.6 10E9/L (ref 1.6–8.3)
NEUTROPHILS NFR BLD AUTO: 55.3 %
NRBC # BLD AUTO: 0 10*3/UL
NRBC BLD AUTO-RTO: 0 /100
PLATELET # BLD AUTO: 309 10E9/L (ref 150–450)
POTASSIUM SERPL-SCNC: 3.5 MMOL/L (ref 3.4–5.3)
PROT SERPL-MCNC: 7.9 G/DL (ref 6.8–8.8)
RBC # BLD AUTO: 4.62 10E12/L (ref 3.8–5.2)
SODIUM SERPL-SCNC: 140 MMOL/L (ref 133–144)
WBC # BLD AUTO: 8.3 10E9/L (ref 4–11)

## 2020-12-30 PROCEDURE — 84702 CHORIONIC GONADOTROPIN TEST: CPT | Performed by: EMERGENCY MEDICINE

## 2020-12-30 PROCEDURE — 83690 ASSAY OF LIPASE: CPT | Performed by: EMERGENCY MEDICINE

## 2020-12-30 PROCEDURE — 99284 EMERGENCY DEPT VISIT MOD MDM: CPT | Mod: 25

## 2020-12-30 PROCEDURE — 85025 COMPLETE CBC W/AUTO DIFF WBC: CPT | Performed by: EMERGENCY MEDICINE

## 2020-12-30 PROCEDURE — 80053 COMPREHEN METABOLIC PANEL: CPT | Performed by: EMERGENCY MEDICINE

## 2020-12-30 ASSESSMENT — ENCOUNTER SYMPTOMS
SHORTNESS OF BREATH: 0
ABDOMINAL PAIN: 1

## 2020-12-30 NOTE — ED AVS SNAPSHOT
[facility] Emergency Dept  [address]  [facility] [address]  Phone: [phone]  Fax: [phone]                                    Adrienne Guzman   MRN: [id number]    Department: [facility] Emergency Dept   Date of Visit: 12/30/2020           After Visit Summary Signature Page    I have received my discharge instructions, and my questions have been answered. I have discussed any challenges I see with this plan with the nurse or doctor.    ..........................................................................................................................................  Patient/Patient Representative Signature      ..........................................................................................................................................  Patient Representative Print Name and Relationship to Patient    ..................................................               ................................................  Date                                   Time    ..........................................................................................................................................  Reviewed by Signature/Title    ...................................................              ..............................................  Date                                               Time          22EPIC Rev 08/18

## 2020-12-31 ENCOUNTER — APPOINTMENT (OUTPATIENT)
Dept: ULTRASOUND IMAGING | Facility: CLINIC | Age: 25
End: 2020-12-31
Attending: EMERGENCY MEDICINE

## 2020-12-31 VITALS
DIASTOLIC BLOOD PRESSURE: 89 MMHG | OXYGEN SATURATION: 100 % | RESPIRATION RATE: 16 BRPM | BODY MASS INDEX: 29.59 KG/M2 | TEMPERATURE: 97.5 F | SYSTOLIC BLOOD PRESSURE: 113 MMHG | HEART RATE: 84 BPM | WEIGHT: 161.8 LBS

## 2020-12-31 LAB
ALBUMIN UR-MCNC: NEGATIVE MG/DL
APPEARANCE UR: CLEAR
BACTERIA #/AREA URNS HPF: ABNORMAL /HPF
BILIRUB UR QL STRIP: NEGATIVE
COLOR UR AUTO: ABNORMAL
GLUCOSE UR STRIP-MCNC: NEGATIVE MG/DL
HGB UR QL STRIP: NEGATIVE
HYALINE CASTS #/AREA URNS LPF: 1 /LPF (ref 0–2)
KETONES UR STRIP-MCNC: NEGATIVE MG/DL
LEUKOCYTE ESTERASE UR QL STRIP: ABNORMAL
MUCOUS THREADS #/AREA URNS LPF: PRESENT /LPF
NITRATE UR QL: NEGATIVE
PH UR STRIP: 6.5 PH (ref 5–7)
RBC #/AREA URNS AUTO: 4 /HPF (ref 0–2)
SOURCE: ABNORMAL
SP GR UR STRIP: 1.02 (ref 1–1.03)
SQUAMOUS #/AREA URNS AUTO: <1 /HPF (ref 0–1)
UROBILINOGEN UR STRIP-MCNC: NORMAL MG/DL (ref 0–2)
WBC #/AREA URNS AUTO: 10 /HPF (ref 0–5)

## 2020-12-31 PROCEDURE — 87086 URINE CULTURE/COLONY COUNT: CPT | Performed by: EMERGENCY MEDICINE

## 2020-12-31 PROCEDURE — 87088 URINE BACTERIA CULTURE: CPT | Performed by: EMERGENCY MEDICINE

## 2020-12-31 PROCEDURE — 76801 OB US < 14 WKS SINGLE FETUS: CPT

## 2020-12-31 PROCEDURE — 81001 URINALYSIS AUTO W/SCOPE: CPT | Performed by: EMERGENCY MEDICINE

## 2020-12-31 PROCEDURE — 87186 SC STD MICRODIL/AGAR DIL: CPT | Performed by: EMERGENCY MEDICINE

## 2020-12-31 NOTE — ED PROVIDER NOTES
History   Chief Complaint:  Abdominal Pain       The history is provided by the patient.      Adrienne Guzman  is a 25 year old female with history of anemia during pregnancy who presents with abrupt onset, sharp lower abdominal pain. The patient reports that she had a positive pregnancy test last Friday. Today, she had two episodes of abdominal pain, the latter being the worst with radiation to her pelvis. Here, she denies any current pain. She mentions that she had her IUD taken out in October. She denies chest pain, shortness of breath, vaginal bleeding or discharge. Denies any complications with her previous pregnancies. LMP was on . Denies concern for STI and declines pelvic exam.  Hx of gallstones but no upper abdominal pain.    Review of Systems   Respiratory: Negative for shortness of breath.    Cardiovascular: Negative for chest pain.   Gastrointestinal: Positive for abdominal pain.   Genitourinary: Negative for vaginal bleeding and vaginal discharge.   All other systems reviewed and are negative.      Allergies:  The patient has no known allergies.     Medications:  Sertraline    Past Medical History:    Complication of IUD  Postpartum hemorrhage  Anxiety  Depression    Social History:  Presents to the ED alone.  Has 2 children at home.     Physical Exam     Patient Vitals for the past 24 hrs:   BP Temp Temp src Pulse Resp SpO2 Weight   20 2309 -- -- -- -- -- -- 73.4 kg (161 lb 12.8 oz)   20 2250 138/79 97.5  F (36.4  C) Temporal 92 16 100 % --       Physical Exam    Gen: alert  HEENT: PERRL, oropharynx clear  Neck: normal ROM  CV: RRR, no murmurs  Pulm: breath sounds equal, lungs clear  Abd: Soft, nontender  Back: no evidence of injury, no cva tenderness  MSK: no deformity, moves all extremities  Skin: no rash  Neuro: alert, appropriate conversation and interaction      Emergency Department Course     Imaging:  OB  US 1st trimester w transvag    (Results Pending)        Laboratory:  Labs Ordered and Resulted from Time of ED Arrival Up to the Time of Departure from the ED   HCG QUANTITATIVE PREGNANCY - Abnormal; Notable for the following components:       Result Value    HCG Quantitative Serum 242 (*)     All other components within normal limits   COMPREHENSIVE METABOLIC PANEL - Abnormal; Notable for the following components:    Anion Gap 2 (*)     Creatinine 0.48 (*)     All other components within normal limits   CBC WITH PLATELETS DIFFERENTIAL   LIPASE   ROUTINE UA WITH MICROSCOPIC   URINE CULTURE AEROBIC BACTERIAL     Procedures      Emergency Department Course:    Reviewed:  I reviewed the patient's nursing notes, vitals, past medical records, Care Everywhere.     Assessments:  2307 Initial examination of the patient.     0045 Rechecked and updated the patient.       Impression & Plan     Medical Decision Making:  Patient presents for pelvic pain in early pregnancy.  HCG positive here.  Blood type O+.  UA appears contaminated and no urinary symptoms- will culture.  Hx of gallstones but normal LFTs and lipase and no upper abdominal tenderness.  Care transferred to Dr. Serra awaiting pelvic US result.  Pt declines STI testing and pelvic exam. Please see Dr. Serra's note for final disposition and plan.    Scribe Disclosure:  I, Abdirahman Aguilar, am serving as a scribe at 11:08 PM on 12/30/2020 to document services personally performed by Margret Mccarthy MD based on my observations and the provider's statements to me.            Margret Mccarthy MD  12/31/20 0105

## 2021-01-01 ENCOUNTER — TELEPHONE (OUTPATIENT)
Dept: EMERGENCY MEDICINE | Facility: CLINIC | Age: 26
End: 2021-01-01

## 2021-01-01 ENCOUNTER — HOSPITAL ENCOUNTER (EMERGENCY)
Facility: CLINIC | Age: 26
Discharge: HOME | End: 2021-01-02
Attending: EMERGENCY MEDICINE | Admitting: EMERGENCY MEDICINE
Payer: MEDICAID

## 2021-01-01 VITALS
RESPIRATION RATE: 20 BRPM | DIASTOLIC BLOOD PRESSURE: 87 MMHG | OXYGEN SATURATION: 97 % | TEMPERATURE: 98.4 F | SYSTOLIC BLOOD PRESSURE: 127 MMHG | HEART RATE: 99 BPM

## 2021-01-01 DIAGNOSIS — R10.84 ABDOMINAL PAIN, GENERALIZED: ICD-10-CM

## 2021-01-01 DIAGNOSIS — R10.2 PELVIC PAIN WITH POSITIVE BETA-HUMAN CHORIONIC GONADOTROPIN (BHCG) IN FEMALE: ICD-10-CM

## 2021-01-01 DIAGNOSIS — Z33.1 PELVIC PAIN WITH POSITIVE BETA-HUMAN CHORIONIC GONADOTROPIN (BHCG) IN FEMALE: ICD-10-CM

## 2021-01-01 DIAGNOSIS — N30.00 ACUTE CYSTITIS WITHOUT HEMATURIA: ICD-10-CM

## 2021-01-01 LAB
BACTERIA SPEC CULT: ABNORMAL
Lab: ABNORMAL
SPECIMEN SOURCE: ABNORMAL

## 2021-01-01 PROCEDURE — 84702 CHORIONIC GONADOTROPIN TEST: CPT | Performed by: EMERGENCY MEDICINE

## 2021-01-01 PROCEDURE — 99283 EMERGENCY DEPT VISIT LOW MDM: CPT

## 2021-01-01 ASSESSMENT — ENCOUNTER SYMPTOMS
DYSURIA: 0
ABDOMINAL PAIN: 0
HEMATURIA: 0

## 2021-01-01 NOTE — ED AVS SNAPSHOT
[facility] Emergency Dept  [address]  [facility] [address]  Phone: [phone]  Fax: [phone]                                    Adrienne Guzman   MRN: [id number]    Department: [facility] Emergency Dept   Date of Visit: 1/1/2021           After Visit Summary Signature Page    I have received my discharge instructions, and my questions have been answered. I have discussed any challenges I see with this plan with the nurse or doctor.    ..........................................................................................................................................  Patient/Patient Representative Signature      ..........................................................................................................................................  Patient Representative Print Name and Relationship to Patient    ..................................................               ................................................  Date                                   Time    ..........................................................................................................................................  Reviewed by Signature/Title    ...................................................              ..............................................  Date                                               Time          22EPIC Rev 08/18

## 2021-01-01 NOTE — TELEPHONE ENCOUNTER
[facility] Emergency Department Lab result notification [Adult-Female]    [place] ED lab result protocol used  Urine culture    Reason for call  Notify of lab results, assess symptoms,  review ED providers recommendations/discharge instructions (if necessary) and advise per ED lab result f/u protocol    Lab Result (including Rx patient on, if applicable)  Preliminary urine culture report on 21 shows the presence of bacteria(s): >100,000 colonies/mL Escherichia coli   Emergency Dept/Urgent Care discharge antibiotic: None  Recommendations per [place] ED Lab result protocol - Urine culture protocol.  Information table from ED Provider visit on 20  Symptoms reported at ED visit (Chief complaint, HPI) Abdominal Pain        The history is provided by the patient.      Adrienne Guzman  is a 25 year old female with history of anemia during pregnancy who presents with abrupt onset, sharp lower abdominal pain. The patient reports that she had a positive pregnancy test last Friday. Today, she had two episodes of abdominal pain, the latter being the worst with radiation to her pelvis. Here, she denies any current pain. She mentions that she had her IUD taken out in October. She denies chest pain, shortness of breath, vaginal bleeding or discharge. Denies any complications with her previous pregnancies. LMP was on . Denies concern for STI and declines pelvic exam.  Hx of gallstones but no upper abdominal pain.   Significant Medical hx, if applicable (i.e. CKD, diabetes) Pregnant   Allergies No Known Allergies   Weight, if applicable Wt Readings from Last 2 Encounters:   20 73.4 kg (161 lb 12.8 oz)   20 72.6 kg (160 lb)      Coumadin/Warfarin [Yes /No] No   Creatinine Level (mg/dl) Creatinine   Date Value Ref Range Status   2020 0.48 (L) 0.52 - 1.04 mg/dL Final      Creatinine clearance (ml/min), if applicable Serum creatinine: 0.48 mg/dL (L) 20  2317  Estimated creatinine clearance: 166.5 mL/min (A)   Pregnant (Yes/No/NA) Yes   Breastfeeding (Yes/No/NA) ?   ED providers Impression and Plan (applicable information) Patient presents for pelvic pain in early pregnancy.  HCG positive here.  Blood type O+.  UA appears contaminated and no urinary symptoms- will culture.  Hx of gallstones but normal LFTs and lipase and no upper abdominal tenderness.  Care transferred to Dr. Serra awaiting pelvic US result.  Pt declines STI testing and pelvic exam. Please see Dr. Serra's note for final disposition and plan.    This is a patient with a recent attack of epigastric pain consistent with biliary colic.  I have recommended laparoscopic cholecystectomy.  We discussed the procedure, along with its risks and complications, in detail.  The patient would like to proceed.  She has to work out some issues regarding insurance, and will call us when she is ready to schedule surgery.  I have recommended a low-fat diet in the interim.  If she has severe return of symptoms, she should return to the emergency room.     Juaquin Daniel MD  [facility]      ED diagnosis Unable to locate Dr Serra's note for final disposition and plan.    ED provider Margret Mccarthy MD  12/31/20 0102        RN Assessment (Patient s current Symptoms), include time called.  [Insert Left message here if message left]  10:23 am Message left to call us back at [phone], between 10 am and 6 pm, seven days a week.     Chela Martel RN  [facility]   Lung Nodule and ED Lab Result F/U RN  Epic pool (ED late result f/u RN): [id number]  # [phone]

## 2021-01-02 LAB — B-HCG SERPL-ACNC: 568 IU/L (ref 0–5)

## 2021-01-02 RX ORDER — CEPHALEXIN 500 MG/1
500 CAPSULE ORAL 2 TIMES DAILY
Qty: 10 CAPSULE | Refills: 0 | Status: SHIPPED | OUTPATIENT
Start: 2021-01-02 | End: 2021-01-07

## 2021-01-02 NOTE — TELEPHONE ENCOUNTER
[facility] Emergency Department/Urgent Care Lab result notification:    [place] ED lab result protocol used  Urine Culture      Reason for call  Notify of lab results, assess symptoms,  review ED providers recommendations/discharge instructions (if necessary) and advise per ED lab result f/u protocol    Lab Result   Final urine culture on 21 shows the presence of bacteria(s): >100,000 colonies/mL Escherichia coli  [place] Emergency Dept/Urgent Care discharge antibiotic: None  As per  ED lab result protocol, treat per Urine culture protocol.  Information table from ED Provider visit on 20  Symptoms reported at ED visit (Chief complaint, HPI) Abdominal Pain        The history is provided by the patient.      Adrienne Guzman  is a 25 year old female with history of anemia during pregnancy who presents with abrupt onset, sharp lower abdominal pain. The patient reports that she had a positive pregnancy test last Friday. Today, she had two episodes of abdominal pain, the latter being the worst with radiation to her pelvis. Here, she denies any current pain. She mentions that she had her IUD taken out in October. She denies chest pain, shortness of breath, vaginal bleeding or discharge. Denies any complications with her previous pregnancies. LMP was on . Denies concern for STI and declines pelvic exam.  Hx of gallstones but no upper abdominal pain.   ED providers Impression and Plan (applicable information) Patient presents for pelvic pain in early pregnancy.  HCG positive here.  Blood type O+.  UA appears contaminated and no urinary symptoms- will culture.  Hx of gallstones but normal LFTs and lipase and no upper abdominal tenderness.  Care transferred to Dr. Serra awaiting pelvic US result.  Pt declines STI testing and pelvic exam. Please see Dr. Serra's note for final disposition and plan.     This is a patient with a recent attack of epigastric pain consistent with biliary  colic.  I have recommended laparoscopic cholecystectomy.  We discussed the procedure, along with its risks and complications, in detail.  The patient would like to proceed.  She has to work out some issues regarding insurance, and will call us when she is ready to schedule surgery.  I have recommended a low-fat diet in the interim.  If she has severe return of symptoms, she should return to the emergency room.     Juaquin Daniel MD  [facility]   Miscellaneous information      RN Assessment (Patient s current Symptoms), include time called.  [Insert Left message here if message left]  10:36 Left voicemail message requesting a call back to [facility] ED Lab Result RN at [phone].  RN is available every day between 10 a.m. and 6:30 p.m..      [RN Name]  Porsha Cormier  [facility]  Emergency Dept Lab Result RN  Ph# [phone]      Copy of Lab result   Urine Culture Aerobic Bacterial  Order: [id number]  Status:  Final result   Visible to patient:  No (inaccessible in MyChart) Dx:  Pelvic pain in pregnancy  Specimen Information: Midstream Urine        Component 2d ago   Specimen Description Midstream Urine    Special Requests Specimen received in preservative    Culture Micro Abnormal   >100,000 colonies/mL   Escherichia coli     [facility]   Susceptibility    Escherichia coli (1)    Antibiotic Interpretation Sensitivity Method Status   AMPICILLIN Sensitive <=2 ug/mL ADRIANA Final   CEFAZOLIN Sensitive <=4 ug/mL ADRIANA Final    Cefazolin ADRIANA breakpoints are for the treatment of uncomplicated urinary tract    infections.  For the treatment of systemic infections, please contact the   laboratory for additional testing.   CEFOXITIN Sensitive <=4 ug/mL ADRIANA Final   CEFTAZIDIME Sensitive <=1 ug/mL ADRIANA Final   CEFTRIAXONE Sensitive <=1 ug/mL ADRIANA Final   CIPROFLOXACIN Sensitive <=0.25 ug/mL ADRIANA Final   GENTAMICIN Sensitive <=1 ug/mL ADRIANA Final   LEVOFLOXACIN  Sensitive <=0.12 ug/mL ADRIANA Final   NITROFURANTOIN Sensitive <=16 ug/mL ADRIANA Final   TOBRAMYCIN Sensitive <=1 ug/mL ADRIANA Final   Trimethoprim/Sulfa Sensitive <=1/19 ug/mL ADRIANA Final   AMPICILLIN/SULBACTAM Sensitive <=2 ug/mL ADRIANA Final   Piperacillin/Tazo Sensitive <=4 ug/mL ADRIANA Final   CEFEPIME Sensitive <=1 ug/mL ADRIANA Final         Specimen Collected: 12/31/20 12:07 AM Last Resulted: 01/01/21  9:59 PM

## 2021-01-02 NOTE — TELEPHONE ENCOUNTER
[facility] Emergency Department/Urgent Care Lab result notification     Patient/parent Name  Adrienne    RN Assessment (Patient s current Symptoms), include time called.  [Insert Left message here if message left]  10:46 Spoke with patient, She states she is feeling well. Does state she experiences urinary frequency.  RN Recommendations/Instructions per [place] ED lab result protocol  Patient notified of lab result and treatment recommendations.  Rx for Cephalexin (Keflex) 500 mg capsule, 1 capsule (500 mg) by mouth 2 times daily for 5 days. sent to [Pharmacy - [facility]].  RN reviewed information about Patient Education on preventing future UTI's.  1. Practice good personal hygiene. Wipe yourself from front to back after using the toilet. This helps keep bacteria from getting into the urethra. Keep the genital area clean and dry.  2. Drink plenty of fluids, such as water, juice, or other caffeine-free drinks.  Drink at least 6-8 glasses a day (1 1/2 to 2 1/2 liters), unless you must restrict fluids for other medical reasons. This will force the medicine (antibiotic) into your urinary system and flush the bacteria out of your body. Cranberry juice has been shown to help clear out the bacteria.  3. Empty your bladder. Always empty your bladder when you feel the urge to urinate. And always urinate before going to sleep. Urine that stays in your bladder can lead to infection. Try to urinate before and after sex as well.  4. Use condoms during sex. These help prevent UTIs caused by sexually transmitted bacteria. Also, avoid using spermicides during sex. These can increase the risk of UTIs. Choose other forms of birth control instead. For women who tend to get UTIs after sex, a low-dose of a preventive antibiotic may be used. Be sure to discuss this option with your health care provider.  5. Follow up with your health care provider as directed. He or she may test to make sure the infection has cleared. If  necessary, additional treatment may be started.       Please Contact your PCP clinic or return to the Emergency department if your:    Symptoms return.    Symptoms do not improve after 3 days on antibiotic.    Symptoms do not resolve after completing antibiotic.    Symptoms worsen or other concerning symptom's.    PCP follow-up Questions asked: YES       [RN Name]  Porsha Cormier  [facility]  Emergency Dept Lab Result RN  Ph# [phone]

## 2021-01-02 NOTE — ED TRIAGE NOTES
Pt states seen at this facility 48 hours pta and had workup including quantitative beta HCG test. Pt states was instructed to have test repeated within 48 hours, pt states was unable to see PCP for retest within that time frame. ABCs intact GCS 15

## 2021-01-02 NOTE — ED PROVIDER NOTES
History   Chief Complaint:  Pregnancy Test     HPI   Adrienne Guzman is a  25 year old female with history of anemia during pregnancy who presents for a pregnancy test. The patient was seen in the ED 2 days ago for evaluation of a sudden onset of harp lower abdominal pain after having a positive pregnancy test 4 days prior. Today she presents today for a follow up pregnancy test. Here, she states her abdominal pain has resolved. She states her LMP was 11/4 and she possibly had another in December. She denies dysuria, hematuria, and vaginal bleeding.     Review of Systems   Gastrointestinal: Negative for abdominal pain.   Genitourinary: Negative for dysuria, hematuria, vaginal bleeding and vaginal discharge.        Positive for cramping   All other systems reviewed and are negative.    Allergies:  The patient has no known allergies.      Medications:  Sertraline     Past Medical History:    Complication of IUD  Postpartum hemorrhage  Anxiety  Depression     Social History:  Presents to the ED alone.  Has 2 children at home.     Physical Exam     Patient Vitals for the past 24 hrs:   BP Temp Temp src Pulse Resp SpO2   21 2331 127/87 98.4  F (36.9  C) Oral 99 20 97 %     Physical Exam    Constitutional: Alert  HENT:    Nose: Nose normal.    Mouth/Throat: Oropharynx is clear, mucous membranes are moist  Eyes: EOM are normal. Pupils are equal, round, and reactive to light.   CV: Regular rate and rhythm, no murmurs, rubs or gallops.  Resp: Clear lungs to auscultation, all lung fields. Normal respiratory effort.   GI: Soft, non-distended. There is no tenderness. No rebound or guarding.   MSK: Normal range of motion. No deformity.   Neurological:   A/Ox3  5/5 strength is symmetric to the upper and lower extremities;   Sensation intact to light touch throughout the upper and lower extremities;   Skin: Skin is warm and dry.     Emergency Department Course     Laboratory:  HCG Quantitative pregnancy  (blood): 568    Emergency Department Course:  Reviewed:  2335: I reviewed the patient's nursing notes, vitals, past medical records, and Care Everywhere.     Assessments:  2338: I performed an exam of the patient as documented above.     0024: I rechecked the patient and discussed the ED workup thus far.      Disposition:  Discharged to home.    Impression & Plan         Medical Decision Makin year old female here for repeat beta-HCG. Patient was here two days ago with abdominal pain and told to return in 48 hours for repeat testing. Patient here with repeat testing her sharp stabbing pain has resolved. Exam as above. Beta-HCG is 568. She has appointment with OB/GYN on Monday. I do not think more significant work-up is indicated at this time. She is having no pain or bleeding at this time.  Patient expressed understanding and was in agreement with the plan and discharge instructions discussed which included reasons to return and follow-up.      Diagnosis:    ICD-10-CM    1. Abdominal pain, generalized  R10.84    2. Pelvic pain with positive beta-human chorionic gonadotropin (BhCG) in female  Z33.1     R10.2      Scribe Disclosure:  I, Sandra Rousseau, am serving as a scribe at 11:35 PM on 2021 to document services personally performed by Zack Lopez DO based on my observations and the provider's statements to me.      Zack Lopez DO  21 0027

## 2021-03-05 ENCOUNTER — HOSPITAL ENCOUNTER (EMERGENCY)
Facility: CLINIC | Age: 26
Discharge: HOME | End: 2021-03-05
Attending: EMERGENCY MEDICINE | Admitting: EMERGENCY MEDICINE
Payer: MEDICAID

## 2021-03-05 ENCOUNTER — APPOINTMENT (OUTPATIENT)
Dept: ULTRASOUND IMAGING | Facility: CLINIC | Age: 26
End: 2021-03-05
Attending: EMERGENCY MEDICINE
Payer: MEDICAID

## 2021-03-05 VITALS
BODY MASS INDEX: 28.17 KG/M2 | OXYGEN SATURATION: 100 % | RESPIRATION RATE: 18 BRPM | DIASTOLIC BLOOD PRESSURE: 59 MMHG | TEMPERATURE: 97.5 F | WEIGHT: 154 LBS | HEART RATE: 85 BPM | SYSTOLIC BLOOD PRESSURE: 116 MMHG

## 2021-03-05 VITALS
SYSTOLIC BLOOD PRESSURE: 118 MMHG | TEMPERATURE: 98.1 F | HEART RATE: 88 BPM | OXYGEN SATURATION: 99 % | DIASTOLIC BLOOD PRESSURE: 78 MMHG | RESPIRATION RATE: 20 BRPM

## 2021-03-05 DIAGNOSIS — R10.13 EPIGASTRIC PAIN: ICD-10-CM

## 2021-03-05 DIAGNOSIS — N30.00 ACUTE CYSTITIS WITHOUT HEMATURIA: ICD-10-CM

## 2021-03-05 DIAGNOSIS — O26.892 ABDOMINAL PAIN DURING PREGNANCY IN SECOND TRIMESTER: ICD-10-CM

## 2021-03-05 DIAGNOSIS — R10.9 ABDOMINAL PAIN DURING PREGNANCY IN SECOND TRIMESTER: ICD-10-CM

## 2021-03-05 LAB
ALBUMIN SERPL-MCNC: 2.8 G/DL (ref 3.4–5)
ALBUMIN UR-MCNC: NEGATIVE MG/DL
ALP SERPL-CCNC: 70 U/L (ref 40–150)
ALT SERPL W P-5'-P-CCNC: 20 U/L (ref 0–50)
ANION GAP SERPL CALCULATED.3IONS-SCNC: 7 MMOL/L (ref 3–14)
APPEARANCE UR: CLEAR
AST SERPL W P-5'-P-CCNC: 13 U/L (ref 0–45)
BACTERIA #/AREA URNS HPF: ABNORMAL /HPF
BASOPHILS # BLD AUTO: 0 10E9/L (ref 0–0.2)
BASOPHILS NFR BLD AUTO: 0.4 %
BILIRUB SERPL-MCNC: 0.2 MG/DL (ref 0.2–1.3)
BILIRUB UR QL STRIP: NEGATIVE
BUN SERPL-MCNC: 7 MG/DL (ref 7–30)
CALCIUM SERPL-MCNC: 8.9 MG/DL (ref 8.5–10.1)
CHLORIDE SERPL-SCNC: 107 MMOL/L (ref 94–109)
CO2 SERPL-SCNC: 23 MMOL/L (ref 20–32)
COLOR UR AUTO: ABNORMAL
CREAT SERPL-MCNC: 0.44 MG/DL (ref 0.52–1.04)
CRP SERPL-MCNC: 9.6 MG/L (ref 0–8)
DIFFERENTIAL METHOD BLD: NORMAL
EOSINOPHIL # BLD AUTO: 0 10E9/L (ref 0–0.7)
EOSINOPHIL NFR BLD AUTO: 0.6 %
ERYTHROCYTE [DISTWIDTH] IN BLOOD BY AUTOMATED COUNT: 13.1 % (ref 10–15)
GFR SERPL CREATININE-BSD FRML MDRD: >90 ML/MIN/{1.73_M2}
GLUCOSE SERPL-MCNC: 81 MG/DL (ref 70–99)
GLUCOSE UR STRIP-MCNC: NEGATIVE MG/DL
HCT VFR BLD AUTO: 38.9 % (ref 35–47)
HGB BLD-MCNC: 12.4 G/DL (ref 11.7–15.7)
HGB UR QL STRIP: NEGATIVE
IMM GRANULOCYTES # BLD: 0 10E9/L (ref 0–0.4)
IMM GRANULOCYTES NFR BLD: 0.3 %
KETONES UR STRIP-MCNC: NEGATIVE MG/DL
LEUKOCYTE ESTERASE UR QL STRIP: ABNORMAL
LIPASE SERPL-CCNC: 90 U/L (ref 73–393)
LYMPHOCYTES # BLD AUTO: 2.3 10E9/L (ref 0.8–5.3)
LYMPHOCYTES NFR BLD AUTO: 32.7 %
MCH RBC QN AUTO: 30.2 PG (ref 26.5–33)
MCHC RBC AUTO-ENTMCNC: 31.9 G/DL (ref 31.5–36.5)
MCV RBC AUTO: 95 FL (ref 78–100)
MONOCYTES # BLD AUTO: 0.8 10E9/L (ref 0–1.3)
MONOCYTES NFR BLD AUTO: 11.6 %
MUCOUS THREADS #/AREA URNS LPF: PRESENT /LPF
NEUTROPHILS # BLD AUTO: 3.9 10E9/L (ref 1.6–8.3)
NEUTROPHILS NFR BLD AUTO: 54.4 %
NITRATE UR QL: NEGATIVE
NRBC # BLD AUTO: 0 10*3/UL
NRBC BLD AUTO-RTO: 0 /100
PH UR STRIP: 6.5 PH (ref 5–7)
PLATELET # BLD AUTO: 259 10E9/L (ref 150–450)
POTASSIUM SERPL-SCNC: 3.6 MMOL/L (ref 3.4–5.3)
PROT SERPL-MCNC: 6.8 G/DL (ref 6.8–8.8)
RBC # BLD AUTO: 4.11 10E12/L (ref 3.8–5.2)
RBC #/AREA URNS AUTO: 1 /HPF (ref 0–2)
SODIUM SERPL-SCNC: 137 MMOL/L (ref 133–144)
SOURCE: ABNORMAL
SP GR UR STRIP: 1.02 (ref 1–1.03)
SQUAMOUS #/AREA URNS AUTO: 1 /HPF (ref 0–1)
UROBILINOGEN UR STRIP-MCNC: NORMAL MG/DL (ref 0–2)
WBC # BLD AUTO: 7.2 10E9/L (ref 4–11)
WBC #/AREA URNS AUTO: 2 /HPF (ref 0–5)

## 2021-03-05 PROCEDURE — 96360 HYDRATION IV INFUSION INIT: CPT

## 2021-03-05 PROCEDURE — 96361 HYDRATE IV INFUSION ADD-ON: CPT

## 2021-03-05 PROCEDURE — 83690 ASSAY OF LIPASE: CPT | Performed by: EMERGENCY MEDICINE

## 2021-03-05 PROCEDURE — 87186 SC STD MICRODIL/AGAR DIL: CPT | Performed by: EMERGENCY MEDICINE

## 2021-03-05 PROCEDURE — 99284 EMERGENCY DEPT VISIT MOD MDM: CPT | Mod: 25

## 2021-03-05 PROCEDURE — 87088 URINE BACTERIA CULTURE: CPT | Performed by: EMERGENCY MEDICINE

## 2021-03-05 PROCEDURE — 86140 C-REACTIVE PROTEIN: CPT | Performed by: EMERGENCY MEDICINE

## 2021-03-05 PROCEDURE — 258N000003 HC RX IP 258 OP 636: Performed by: EMERGENCY MEDICINE

## 2021-03-05 PROCEDURE — 76801 OB US < 14 WKS SINGLE FETUS: CPT

## 2021-03-05 PROCEDURE — 80053 COMPREHEN METABOLIC PANEL: CPT | Performed by: EMERGENCY MEDICINE

## 2021-03-05 PROCEDURE — 99282 EMERGENCY DEPT VISIT SF MDM: CPT

## 2021-03-05 PROCEDURE — 87086 URINE CULTURE/COLONY COUNT: CPT | Performed by: EMERGENCY MEDICINE

## 2021-03-05 PROCEDURE — 81001 URINALYSIS AUTO W/SCOPE: CPT | Performed by: EMERGENCY MEDICINE

## 2021-03-05 PROCEDURE — 250N000013 HC RX MED GY IP 250 OP 250 PS 637: Performed by: EMERGENCY MEDICINE

## 2021-03-05 PROCEDURE — 85025 COMPLETE CBC W/AUTO DIFF WBC: CPT | Performed by: EMERGENCY MEDICINE

## 2021-03-05 RX ORDER — ACETAMINOPHEN 325 MG/1
650 TABLET ORAL ONCE
Status: COMPLETED | OUTPATIENT
Start: 2021-03-05 | End: 2021-03-05

## 2021-03-05 RX ORDER — CEPHALEXIN 500 MG/1
500 CAPSULE ORAL 2 TIMES DAILY
Qty: 10 CAPSULE | Refills: 0 | Status: SHIPPED | OUTPATIENT
Start: 2021-03-05 | End: 2021-03-10

## 2021-03-05 RX ORDER — CEPHALEXIN 500 MG/1
500 CAPSULE ORAL ONCE
Status: COMPLETED | OUTPATIENT
Start: 2021-03-05 | End: 2021-03-05

## 2021-03-05 RX ADMIN — CEPHALEXIN 500 MG: 500 CAPSULE ORAL at 23:16

## 2021-03-05 RX ADMIN — SODIUM CHLORIDE 1000 ML: 9 INJECTION, SOLUTION INTRAVENOUS at 20:48

## 2021-03-05 RX ADMIN — ACETAMINOPHEN 650 MG: 325 TABLET, FILM COATED ORAL at 20:51

## 2021-03-05 ASSESSMENT — ENCOUNTER SYMPTOMS
DIFFICULTY URINATING: 0
DYSURIA: 0
FEVER: 0
VOMITING: 0
COUGH: 0
NAUSEA: 0
ABDOMINAL PAIN: 1
HEMATURIA: 0
VOMITING: 0
NAUSEA: 0

## 2021-03-05 NOTE — ED PROVIDER NOTES
History     Chief Complaint:  Abdominal Pain      HPI  Adrienne Guzman is a 25 year old female, 13 weeks pregnant, with a history of gallstones and peptic ulcer disease who presents to the emergency department for evaluation of abdominal pain. Patient reports eating Doritos tonight and prior to going to bed she began having epigastric pain. Patient states pain improved on arrival to the ED. She denies nausea, vomiting, vaginal bleeding, and vaginal discharge.     Review of Systems   Gastrointestinal: Positive for abdominal pain. Negative for nausea and vomiting.   Genitourinary: Negative for vaginal bleeding and vaginal discharge.   All other systems reviewed and are negative.    Allergies:  No known drug allergies    Medications:    Zoloft  Folic acid    Past Medical History:    GERD  H pylori  PUD  Gallstones    Past Surgical History:    GYN surgery    Social History:  The patient presents to the emergency department alone.  Patient is 13 weeks pregnant.    Physical Exam     Patient Vitals for the past 24 hrs:   BP Temp Temp src Pulse Resp SpO2   03/05/21 0009 118/78 98.1  F (36.7  C) Oral 88 20 99 %         Physical Exam  Constitutional:  Oriented to person, place, and time.   HENT:   Head:    Normocephalic.   Mouth/Throat:   Oropharynx is clear and moist.   Eyes:    EOM are normal. Pupils are equal, round, and reactive to light.   Neck:    Neck supple.   Cardiovascular:  Normal rate, regular rhythm and normal heart sounds.      Exam reveals no gallop and no friction rub.       No murmur heard.  Pulmonary/Chest:  Effort normal and breath sounds normal.      No respiratory distress. No wheezes. No rales.      No reproducible chest wall pain.  Abdominal:   Soft. No distension. No tenderness. No rebound and no guarding. Negative Dumont's sign.  Musculoskeletal:  Normal range of motion.   Neurological:   Alert and oriented to person, place, and time.           Moves all 4 extremities spontaneously     Skin:    No rash noted. No pallor.         Emergency Department Course     Emergency Department Course:  Reviewed:  I reviewed the patient's nursing notes, vitals, past medical records, Care Everywhere.     Assessments:  0019 I assessed the patient. Exam findings described above.    Disposition:  Discharged to home.    Impression & Plan    Medical Decision Making:  Adrienne Guzman is a 25 year old female, 13 weeks pregnant, that is here for epigastric abdominal pain after eating Doritos chips. She has known ulcer as well as known biliary disease. Fortunately just prior to arrival her symptoms resolved. She now denies any abdominal pain and has a benign exam with no reproducible tenderness and negative Dumont's sign. Differential includes gastritis, biliary colic, gastric ulcer, or other causes. Pancreatitis is also considered but as pain is resolved this is quite unlikely. With resolved symptoms I do not believe she would benefit from any workup, laboratory, or imaging and as her symptoms are resolved she does not need any intervention. She was told to continue with antacids, follow up with her primary doctor, avoid fatty foods, and return for any worsening abdominal pain, fever, vomiting, and inability to tolerate PO.     Critical Care time:  none    Diagnosis:    ICD-10-CM    1. Epigastric pain  R10.13        Vega Montalvo  3/5/2021   EMERGENCY DEPARTMENT  Scribe Disclosure:  I, Vega Montalvo, am serving as a scribe at 12:19 AM on 3/5/2021 to document services personally performed by Justin Coe MD based on my observations and the provider's statements to me.          Justin Coe MD  03/05/21 0450

## 2021-03-05 NOTE — ED TRIAGE NOTES
Pt states abdominal pain after eating chips tonight. Pt states hx of active H. Pylori infection and peptic ulcers. Pt also states 13 weeks gestation. Pt denies vaginal discharge. ABCs intact GCS 15
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
## 2021-03-06 NOTE — ED TRIAGE NOTES
"Pt is 13 weeks pregnant. C/O lower abdominal \"tightness\" 3x per hour in the last hour. Pt also states back pain. Denies vaginal bleeding. Pt did not seen OBGYN today. Pt has a 3 year old child but does not know if it feels like contractions. Denies vomiting or diarrhea.  ABC in tact. A/OX4  "

## 2021-03-06 NOTE — ED PROVIDER NOTES
History   Chief Complaint:  Abdominal Pain       HPI   Adrienne Guzman is a 25 year old female who presents with abdominal pain. Patient is currently 13 weeks pregnant. She had an onset of lower abdominal pain and tightness at 1100 this morning. She last had a bowel movement last night. She also endorses some vaginal discharge and a throbbing pain which began today, as well. She denies any urinary symptoms, fever, nausea, vomit, vaginal bleeding, or cough. She is sexually active and last intercourse was 1 week ago. She denies any chance of sexual transmitted disease.      Review of Systems   Constitutional: Negative for fever.   Respiratory: Negative for cough.    Gastrointestinal: Negative for nausea and vomiting.   Genitourinary: Positive for vaginal discharge and vaginal pain. Negative for difficulty urinating, dysuria, hematuria and vaginal bleeding.   All other systems reviewed and are negative.      Allergies:  No Known Allergies    Medications:  Tylenol   Colace   Motrin   Roxicodone     Past Medical History:    GERD     Past Surgical History:    IUD placement      Social History:  Patient presents to the ED alone.    Physical Exam     Patient Vitals for the past 24 hrs:   BP Temp Temp src Pulse Resp SpO2 Weight   03/05/21 2300 116/59 -- -- 85 -- -- --   03/05/21 2100 102/69 -- -- 83 -- 100 % --   03/05/21 2045 100/66 -- -- -- -- 92 % --   03/05/21 2016 117/78 97.5  F (36.4  C) Temporal 95 18 100 % 69.9 kg (154 lb)       Physical Exam  Gen: alert  HEENT: PERRL, oropharynx clear  CV: RRR, no murmurs  Pulm: breath sounds equal, lungs clear  Abd: gravid abdomen, mild suprapubic tenderness.  Back: No CVA tenderness  MSK: no LE edema  Skin: no rash  Neuro: alert, appropriate conversation and interaction    Emergency Department Course     Imaging:  US OB <14 weeks:  1. Single living intrauterine gestation at 13 weeks 6 days, EDC 09/04/2021. Reading per radiology.    Laboratory:  CBC: WBC: 7.2, HGB:  12.4, PLT: 259  CMP:  Albumin: 2.8 (low), creatinine 0.44 (low) o/w WNL   UA: Leukocyte Esterase: moderate (!), Bacteria: few (!), Mucous: Present, o/w Negative  Lipase: 90  CRP inflammation: 9.6 (H)    Urine Culture Aerobic Bacterial: Pending    Emergency Department Course:    Reviewed:  nursing notes, vitals, past medical history and care everywhere    Assessments:    2045 Initial assessment     2252 I rechecked the patient and discussed the results of her workup thus far. Patient says she is feeling slightly better. I reexamined her abdomen and she has no LLQ tenderness. She notes that when she went to the bathroom it felt funny or pressure. Discussed with her that we will treat her for possible UTI.     Consults:   None     Interventions:  2048 NS 1L IV Bolus  2051 Tylenol 650 mg PO  Keflex 500 mg PO        Disposition:  The patient was discharged to home.     Impression & Plan     Medical Decision Making:  Patient presents for pelvic cramping in pregnancy.  Mild suprapubic tenderness on exam.  No RLQ tenderness to suggest appendicitis.  Seen in ED yesterday for GERD/gastrtis but no upper abdominal pain today and normal LFTs and LIpase.  UA has bacteria.  Given this finding and location of symtpoms and vague urianry symptoms, will start on keflex for possible UTI.  UC to follow.  See OB this week.  ABdominal pain precautions discussed.  Discharge home.     Diagnosis:    ICD-10-CM    1. Abdominal pain during pregnancy in second trimester  O26.892 Urine Culture Aerobic Bacterial    R10.9    2. Acute cystitis without hematuria  N30.00      Discharge Medications:  Discharge Medication List as of 3/5/2021 11:18 PM      START taking these medications    Details   cephALEXin (KEFLEX) 500 MG capsule Take 1 capsule (500 mg) by mouth 2 times daily for 5 days, Disp-10 capsule, R-0, E-Prescribe           Scribe Disclosure:  I, Mitchell Case, am serving as a scribe at 8:29 PM on 3/5/2021 to document services personally  performed by Margret Mccarthy MD based on my observations and the provider's statements to me.            Margret Mccarthy MD  03/06/21 0052

## 2021-03-06 NOTE — DISCHARGE INSTRUCTIONS
Discharge Instructions  Urinary Tract Infection  You or your child have been diagnosed with a urinary tract infection, or UTI. The urinary tract includes the kidneys (which make urine/pee), ureters (the tubes that carry urine/pee from the kidneys to the bladder), the bladder (which stores urine/pee), and urethra (the tube that carries urine/pee out of the bladder). Urinary tract infections occur when bacteria travel up the urethra into the bladder (bladder infection) and, in some cases, from there into the kidneys (kidney infection).  Generally, every Emergency Department visit should have a follow-up clinic visit with either a primary or a specialty clinic/provider. Please follow-up as instructed by your emergency provider today.  Return to the Emergency Department if:  You or your child have severe back pain.  You or your child are vomiting (throwing up) so that you cannot take your medicine.  You or your child have a new fever (had not previously had a fever) over 101 F.  You or your child have confusion or are very weak, or feel very ill.  Your child seems much more ill, will not wake up, will not respond right, or is crying for a long time and will not calm down.  You or your child are showing signs of dehydration. These signs may include decreased urination (pee), dry mouth/gums/tongue, or decreased activity.    Follow-up with your provider:   Children under 24 months need to be seen by their regular provider within one week after a diagnosis of a UTI. It may be necessary to do some more tests to look at the child s kidney or bladder.  You should begin to feel better within 24 - 48 hours of starting your antibiotic; follow-up with your regular clinic/doctor/provider if this is not the case.    Treatment:   You will be treated with an antibiotic to kill the bacteria. We have to make an educated guess, based on what we know about common bacteria and antibiotics, as to which antibiotic will work for your  "infection. We will be correct most times but there will be some cases where the antibiotic chosen is not correct (see urine cultures below).  Take a pain medication such as acetaminophen (Tylenol ) or ibuprofen (Advil , Motrin , Nuprin ).  Phenazopyridine (Pyridium , Uristat ) is a prescription medication that numbs the bladder to reduce the burning pain of some UTIs.  The same medication is available in a non-prescription version (Azo-Standard , Urodol ). This medication will change the color of the urine and tears (usually blue or orange). If you wear contacts, do not wear them while taking this medication as they may be stained by the medication.    Urine Cultures:  If indicated, a urine culture may have been performed today. This test generally takes 24-48 hours to complete so the results are not known at this time. The results can confirm that an infection is present but also determine which antibiotic is effective for the specific bacteria that is causing the infection. If your urine culture shows that the antibiotic you were given today will not work to treat your infection, we will attempt to contact you to make arrangements to change the antibiotic. If the culture confirms that the antibiotic is effective for your infection, you will not be contacted. We often recommend follow-up with your regular physician/provider on the culture results regardless of this process.    Antibiotic Warning:   If you have been placed on antibiotics - watch for signs of allergic reaction.  These include rash, lip swelling, difficulty breathing, wheezing, and dizziness.  If you develop any of these symptoms, stop the antibiotic immediately and go to an emergency room or urgent care for evaluation.    Probiotics: If you have been given an antibiotic, you may want to also take a probiotic pill or eat yogurt with live cultures. Probiotics have \"good bacteria\" to help your intestines stay healthy. Studies have shown that probiotics " help prevent diarrhea and other intestine problems (including C. diff infection) when you take antibiotics. You can buy these without a prescription in the pharmacy section of the store.   If you were given a prescription for medicine here today, be sure to read all of the information (including the package insert) that comes with your prescription.  This will include important information about the medicine, its side effects, and any warnings that you need to know about.  The pharmacist who fills the prescription can provide more information and answer questions you may have about the medicine.  If you have questions or concerns that the pharmacist cannot address, please call or return to the Emergency Department.   Remember that you can always come back to the Emergency Department if you are not able to see your regular provider in the amount of time listed above, if you get any new symptoms, or if there is anything that worries you.      Discharge Instructions  Abdominal Pain    Abdominal pain (belly pain) can be caused by many things. Your evaluation today does not show the exact cause for your pain. Your provider today has decided that it is unlikely your pain is due to a life threatening problem, or a problem requiring surgery or hospital admission. Sometimes those problems cannot be found right away, so it is very important that you follow up as directed.  Sometimes only the changes which occur over time allow the cause of your pain to be found.    Generally, every Emergency Department visit should have a follow-up clinic visit with either a primary or a specialty clinic/provider. Please follow-up as instructed by your emergency provider today. With abdominal pain, we often recommend very close follow-up, such as the following day.    ADULTS:  Return to the Emergency Department right away if:    You get an oral temperature above 102oF or as directed by your provider.  You have blood in your stools. This may  be bright red or appear as black, tarry stools.    You keep vomiting (throwing up) or cannot drink liquids.  You see blood when you vomit.   You cannot have a bowel movement or you cannot pass gas.  Your stomach gets bloated or bigger.  Your skin or the whites of your eyes look yellow.  You faint.  You have bloody, frequent or painful urination (peeing).  You have new symptoms or anything that worries you.    CHILDREN:  Return to the Emergency Department right away if your child has any of the above-listed symptoms or the following:    Pushes your hand away or screams/cries when his/her belly is touched.  You notice your child is very fussy or weak.  Your child is very tired and is too tired to eat or drink.  Your child is dehydrated.  Signs of dehydration can be:  Significant change in the amount of wet diapers/urine.  Your infant or child starts to have dry mouth and lips, or no saliva (spit) or tears.    PREGNANT WOMEN:  Return to the Emergency Department right away if you have any of the above-listed symptoms or the following:    You have bleeding, leaking fluid or passing tissue from the vagina.  You have worse pain or cramping, or pain in your shoulder or back.  You have vomiting that will not stop.  You have a temperature of 100oF or more.  Your baby is not moving as much as usual.  You faint.  You get a bad headache with or without eye problems and abdominal pain.  You have a seizure.  You have unusual discharge from your vagina and abdominal pain.    Abdominal pain is pretty common during pregnancy.  Your pain may or may not be related to your pregnancy. You should follow-up closely with your OB provider so they can evaluate you and your baby.  Until you follow-up with your regular provider, do the following:     Avoid sex and do not put anything in your vagina.  Drink clear fluids.  Only take medications approved by your provider.    MORE INFORMATION:    Appendicitis:  A possible cause of abdominal pain in  "any person who still has their appendix is acute appendicitis. Appendicitis is often hard to diagnose.  Testing does not always rule out early appendicitis or other causes of abdominal pain. Close follow-up with your provider and re-evaluations may be needed to figure out the reason for your abdominal pain.    Follow-up:  It is very important that you make an appointment with your clinic and go to the appointment.  If you do not follow-up with your primary provider, it may result in missing an important development which could result in permanent injury or disability and/or lasting pain.  If there is any problem keeping your appointment, call your provider or return to the Emergency Department.    Medications:  Take your medications as directed by your provider today.  Before using over-the-counter medications, ask your provider and make sure to take the medications as directed.  If you have any questions about medications, ask your provider.    Diet:  Resume your normal diet as much as possible, but do not eat fried, fatty or spicy foods while you have pain.  Do not drink alcohol or have caffeine.  Do not smoke tobacco.    Probiotics: If you have been given an antibiotic, you may want to also take a probiotic pill or eat yogurt with live cultures. Probiotics have \"good bacteria\" to help your intestines stay healthy. Studies have shown that probiotics help prevent diarrhea (loose stools) and other intestine problems (including C. diff infection) when you take antibiotics. You can buy these without a prescription in the pharmacy section of the store.     If you were given a prescription for medicine here today, be sure to read all of the information (including the package insert) that comes with your prescription.  This will include important information about the medicine, its side effects, and any warnings that you need to know about.  The pharmacist who fills the prescription can provide more information and " answer questions you may have about the medicine.  If you have questions or concerns that the pharmacist cannot address, please call or return to the Emergency Department.       Remember that you can always come back to the Emergency Department if you are not able to see your regular provider in the amount of time listed above, if you get any new symptoms, or if there is anything that worries you.

## 2021-03-08 LAB
BACTERIA SPEC CULT: ABNORMAL
BACTERIA SPEC CULT: ABNORMAL
Lab: ABNORMAL
SPECIMEN SOURCE: ABNORMAL

## 2021-03-08 NOTE — RESULT ENCOUNTER NOTE
Final urine culture report is NEGATIVE per [facility] Result protocol.    If NEGATIVE result, no change in treatment, per [facility] Result protocol.

## 2021-04-16 ENCOUNTER — HOSPITAL ENCOUNTER (OUTPATIENT)
Facility: CLINIC | Age: 26
Discharge: HOME | End: 2021-04-16
Attending: OBSTETRICS & GYNECOLOGY | Admitting: OBSTETRICS & GYNECOLOGY
Payer: COMMERCIAL

## 2021-04-16 ENCOUNTER — HOSPITAL ENCOUNTER (EMERGENCY)
Facility: CLINIC | Age: 26
End: 2021-04-16
Payer: MEDICAID

## 2021-04-16 ENCOUNTER — HOSPITAL ENCOUNTER (OUTPATIENT)
Facility: CLINIC | Age: 26
End: 2021-04-16
Admitting: OBSTETRICS & GYNECOLOGY
Payer: COMMERCIAL

## 2021-04-16 VITALS
TEMPERATURE: 99.4 F | DIASTOLIC BLOOD PRESSURE: 64 MMHG | HEART RATE: 106 BPM | RESPIRATION RATE: 18 BRPM | SYSTOLIC BLOOD PRESSURE: 118 MMHG | OXYGEN SATURATION: 97 %

## 2021-04-17 NOTE — PLAN OF CARE
"Data: Patient assessed in the [facility] for decreased fetal movement and lower back pain.  Cervical exam not examined.  Membranes intact.  Contractions not occurring.  Action:  Presumed adequate fetal oxygenation documented (see flow record). Discharge instructions reviewed.  Patient instructed to report change in fetal movement, vaginal leaking of fluid or bleeding, abdominal pain, or any concerns related to the pregnancy to her nurse/physician.    Response: Orders to discharge home per Dr. Lewis.  Patient verbalized understanding of education and verbalized agreement with plan.     Of note: prior to discharge, patient mentioned that when she lays down to sleep she feels a \"vibration\" in the center of her chest. States it feels similar to having a chest cold, but she is able to breathe without difficulty. She also reports that occasionally, she wakes during the night \"gasping for air.\" She has a rescue inhaler at home for occasional asthma r/t anxiety. When discussing this, patient appears anxious. SPO2 = 97%, all lung sounds clear and equal bilaterally. Per patient, she has discussed this concern with her midwife, who has recommended a sleep study and placed a cardiology referral. RN informed patient that her baby appears well-oxygenated and her vitals signs are stable, RN also provided emotional support when discussing concerns. Adrienne states she feels safe going home and knows if she has any trouble breathing, she should call 911. She will report any further concerns to her midwife/ PCP.     Discharged to home at 2245.  "

## 2021-04-17 NOTE — PROVIDER NOTIFICATION
"   21   Provider Notification   Provider Name/Title Dr. Lewis   Method of Notification Phone   Request Evaluate - Remote   Notification Reason Patient Arrived     Data: Patient presented to [facility]: 2021  9:29 PM.  Reason for maternal/fetal assessment is decreased fetal movement and lower back pain. Patient reports a sharp lower back pain that started today around 0100 that made it difficult to sleep. She reports feeling 5 fetal movements in 2 hours, which is \"much less than normal for this baby.\" She also reports intermittent sharp pain in her vagina. She got her COVID-19 vaccination yesterday at 1700 and has felt \"very tired and hot\" since. Patient is a .  Prenatal record not yet available, attempting to contact [facility] for records. Pregnancy has been complicated by stomach ulcer d/t H. pylori infection, gallstones.  Gestational Age 19w5d. VSS. Fetal movement present. Patient denies leaking of vaginal fluid/rupture of membranes, vaginal bleeding, abdominal pain, pelvic pressure, nausea, vomiting, headache, visual disturbances, epigastric or URQ pain, significant edema. Support person is not present.   Action: Verbal consent for EFM. Triage assessment completed. Bill of rights reviewed.  Response: Patient verbalized agreement with plan. Dr Monty Lewis with updated by RN: maternal VSS, FM+, FHR tracing appropriate for gestational age, contractions not occurring. Order received to discharge patient home.   "

## 2021-04-17 NOTE — DISCHARGE INSTRUCTIONS
Discharge Instruction for Undelivered Patients      You were seen for: Fetal Assessment  We Consulted: Dr. Lewis  You had (Test or Medicine):external fetal monitoring     Diet:   Drink 8 to 12 glasses of liquids (milk, juice, water) every day.  You may eat meals and snacks.     Activity:  Call your doctor or nurse midwife if your baby is moving less than usual.     Call your provider if you notice:  Swelling in your face or increased swelling in your hands or legs.  Headaches that are not relieved by Tylenol (acetaminophen).  Changes in your vision (blurring: seeing spots or stars.)  Nausea (sick to your stomach) and vomiting (throwing up).   Weight gain of 5 pounds or more per week.  Heartburn that doesn't go away.  Signs of bladder infection: pain when you urinate (use the toilet), need to go more often and more urgently.  The bag of corona (rupture of membranes) breaks, or you notice leaking in your underwear.  Bright red blood in your underwear.  Abdominal (lower belly) or stomach pain.  Second (plus) baby: Contractions (tightening) less than 10 minutes apart and getting stronger.  *If less than 34 weeks: Contractions (tightenings) more than 6 times in one hour.  Increase or change in vaginal discharge (note the color and amount)      Follow-up:  As scheduled in the clinic with your primary care provider

## 2021-04-22 ENCOUNTER — RECORDS - HEALTHEAST (OUTPATIENT)
Dept: ADMINISTRATIVE | Facility: OTHER | Age: 26
End: 2021-04-22

## 2021-04-22 ENCOUNTER — AMBULATORY - HEALTHEAST (OUTPATIENT)
Dept: CARDIOLOGY | Facility: CLINIC | Age: 26
End: 2021-04-22

## 2021-04-28 ENCOUNTER — HOSPITAL ENCOUNTER (EMERGENCY)
Facility: CLINIC | Age: 26
Discharge: HOME | End: 2021-04-28
Attending: PHYSICIAN ASSISTANT | Admitting: PHYSICIAN ASSISTANT
Payer: COMMERCIAL

## 2021-04-28 ENCOUNTER — APPOINTMENT (OUTPATIENT)
Dept: GENERAL RADIOLOGY | Facility: CLINIC | Age: 26
End: 2021-04-28
Attending: PHYSICIAN ASSISTANT
Payer: COMMERCIAL

## 2021-04-28 VITALS
HEART RATE: 90 BPM | SYSTOLIC BLOOD PRESSURE: 114 MMHG | DIASTOLIC BLOOD PRESSURE: 75 MMHG | RESPIRATION RATE: 18 BRPM | OXYGEN SATURATION: 100 % | TEMPERATURE: 98.5 F
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
## 2021-04-28 DIAGNOSIS — M25.562 ACUTE PAIN OF LEFT KNEE: ICD-10-CM

## 2021-04-28 PROCEDURE — 73562 X-RAY EXAM OF KNEE 3: CPT | Mod: LT

## 2021-04-28 PROCEDURE — 99283 EMERGENCY DEPT VISIT LOW MDM: CPT

## 2021-04-28 PROCEDURE — 250N000013 HC RX MED GY IP 250 OP 250 PS 637: Performed by: PHYSICIAN ASSISTANT

## 2021-04-28 RX ORDER — ACETAMINOPHEN 325 MG/1
650 TABLET ORAL ONCE
Status: COMPLETED | OUTPATIENT
Start: 2021-04-28 | End: 2021-04-28

## 2021-04-28 RX ADMIN — ACETAMINOPHEN 650 MG: 325 TABLET, FILM COATED ORAL at 19:53

## 2021-04-28 ASSESSMENT — ENCOUNTER SYMPTOMS
BACK PAIN: 0
ARTHRALGIAS: 1
NECK PAIN: 0

## 2021-04-29 NOTE — ED PROVIDER NOTES
History     Chief Complaint:  Fall     HPI:   Adrienne Guzman is a 25 year old female reportedly 21 weeks who presents via EMS after a fall. Patient states she was jumping over a pet gate today and she fell. Patient thinks she landed on her knees but ended up on her back. She did not hit her head or lose consciousness. Here she reports right hip pain and left knee pain. She does state that the baby has kicked twice since the fall. No back pain, neck pain.    Review of Systems   Musculoskeletal: Positive for arthralgias. Negative for back pain and neck pain.   All other systems reviewed and are negative.       Allergies:  No Known Allergies     Medications:    Colace  Sertraline    Past Medical History:    Anxiety   Depression   Gallstones   GERD  Stomach ulcer  Asthma     Past Surgical History:    IUD removal     Family History:    Breast cancer    Social History:  Patient presents via EMS alone.    Physical Exam     Vitals:  Patient Vitals for the past 24 hrs:   BP Temp Temp src Pulse Resp SpO2   04/28/21 1905 114/75 98.5  F (36.9  C) Oral 90 18 100 %       Physical Exam:  General: Well appearing female lying supine.   Head:  Scalp is atraumatic.  Eyes:  The pupils are equal, round, and reactive to light. Normal conjunctiva.   ENT:                                      Ears:  The external ears are normal.   Nose:  The external nose is normal.  Throat:  The oropharynx is normal. Mucus membranes are moist.                 Neck:  Normal range of motion. No midline cervical tenderness.   CV:  Normal rate. No murmur. 2+ radial pulses  Resp:  Breath sounds are clear bilaterally. Non-labored, no retractions or accessory muscle use.  GI:  Abdomen is soft, no distension, no tenderness.   MS:  Left knee diffuse tenderness and limited ROM secondary to pain. Anterior knee scrap, no deep laceration.   Skin:  Warm and dry. No rash.   Neuro:  Alert. Strength and sensation grossly intact.   Psych:  Awake. Alert.   Appropriate interactions.     Emergency Department Course     Imaging:  XR Left knee  Normal joint spaces and alignment. No fracture or joint effusion.  Per radiology.    Emergency Department Course:  Reviewed:  Past medical records, Care Everywhere, nursing notes, and vitals reviewed.     Assessments:  1945 I performed an exam of the patient and obtained history, as documented above.    Interventions:  1953 Tylenol, 650 mg, PO    Disposition:  The patient was discharged to home.     Impression & Plan     Medical Decision Making:   Adrienne Guzman is a 25 year old female presenting with left knee pain and right hip pain after a minor fall. On my exam, she has diffuse tenderness to the left knee with a superficial scrape, no deep laceration.. XR of left knee is negative for acute fracture.  Signs and symptoms are consistent with an knee sprain versus contusion injury.  No signs ofoccult tibial plateau fracture or neurovascular compromise.   Plan is for protected weightbearing, ice, ace wrap, and tylenol as needed for pain.  Patient will advance weightbearing and follow-up with primary in 2-3 days as needed.  The patient will begin gentle ROM exercises of the knee. The patient will return for worsening symptoms including new numbness, weakness, or worsening pain.    Patient is currently pregnant.  She does not believe she struck her abdomen during the fall and she is feeling baby move.  Fetal heart tones reassuring.  There is no vaginal bleeding.  Return precautions discussed including worsening pain, vaginal bleeding, or any other concerning symptoms develop.    Diagnosis:    ICD-10-CM    1. Acute pain of left knee  M25.562    2. Fall, initial encounter  W19.XXXA         Discharge Medications:  New Prescriptions    No medications on file       Scribe Disclosure:  Brianna GARCIAS, karol serving as a scribe at 7:09 PM on 4/28/2021 to document services personally performed by Mora Reaves PA-C based  on my observations and the provider's statements to me.       Brianna Reza  4/28/2021     Mora Reaves PA-C  04/29/21 0107

## 2021-06-09 NOTE — ED NOTES
Bed: ED27  Expected date: 4/28/21  Expected time: 6:48 PM  Means of arrival: Ambulance  Comments:  B[facility] [id number]  
Patient discharged to home. Patient received discharge instructions and has no other questions at this time.   
Patient presents via EMS with fall around 1820 tonight. She tripped over a baby gate outside and fell onto her knees, but ended up on her back. No LOC, did not hit head. Left knee pain and right hip pain. Patient is 20 weeks pregnant, felt baby kick here in ER during triage, no vaginal bleeding, slight bilateral pelvic pressure.   
No

## 2021-07-09 ENCOUNTER — HOSPITAL ENCOUNTER (OUTPATIENT)
Facility: CLINIC | Age: 26
Discharge: HOME | End: 2021-07-10
Attending: OBSTETRICS & GYNECOLOGY | Admitting: OBSTETRICS & GYNECOLOGY
Payer: COMMERCIAL

## 2021-07-09 VITALS — DIASTOLIC BLOOD PRESSURE: 60 MMHG | RESPIRATION RATE: 16 BRPM | SYSTOLIC BLOOD PRESSURE: 103 MMHG | TEMPERATURE: 97.9 F

## 2021-07-10 ENCOUNTER — HOSPITAL ENCOUNTER (OUTPATIENT)
Facility: CLINIC | Age: 26
End: 2021-07-10
Admitting: OBSTETRICS & GYNECOLOGY
Payer: COMMERCIAL

## 2021-07-10 PROBLEM — Z36.89 ENCOUNTER FOR TRIAGE IN PREGNANT PATIENT: Status: ACTIVE | Noted: 2021-07-10

## 2021-07-10 PROCEDURE — G0463 HOSPITAL OUTPT CLINIC VISIT: HCPCS

## 2021-07-10 NOTE — PLAN OF CARE
Data: Patient presented to [facility]: 2021 11:32 PM.  Reason for maternal/fetal assessment is decreased fetal movement, patient states she ate ice cream, then had a gall bladder attack. Patient states the pain is feeling better, but she has not been feeling her baby move as much as normal. Patient is a .  Prenatal record reviewed. Pregnancy has been uncomplicated..  Gestational Age 31w5d. VSS. Fetal movement decreased since . Patient denies uterine contractions, leaking of vaginal fluid/rupture of membranes, vaginal bleeding, abdominal pain, pelvic pressure, headache, visual disturbances, epigastric or URQ pain, significant edema. Support person is present.   Action: Verbal consent for EFM. Triage assessment completed. Bill of rights reviewed.  Response: Patient verbalized agreement with plan. Will contact Dr Kellie Haney with update and for further orders.

## 2021-07-10 NOTE — PROVIDER NOTIFICATION
07/09/21 9579   Provider Notification   Provider Name/Title Dr. Haney   Method of Notification Phone   Request Evaluate - Remote   Notification Reason Patient Arrived;Pain     Dr Kellie Haney informed of patient arrival and assessment including the following:    Reason for maternal/fetal assessment gall stone pain, decreased fetal movement. Pt reports history of gall stones. Patient ate ice cream and had gall stone flare up. Patient states she hasn't been feeling her baby move as much as was concerned about her baby. Patient does not want pain medication and would like to discharge home if baby looks ok on the monitor. Fetal status normal baseline, moderate variability, accelerations present and no decelerations. Plan per provider/orders received for discharge.

## 2021-07-10 NOTE — DISCHARGE INSTRUCTIONS
Discharge Instruction for Undelivered Patients      You were seen for: Fetal Assessment  We Consulted: Dr. Haney  You had (Test or Medicine):fetal monitoring     Diet:   Drink 8 to 12 glasses of liquids (milk, juice, water) every day.  You may eat meals and snacks.     Activity:  Call your doctor or nurse midwife if your baby is moving less than usual.     Call your provider if you notice:  Swelling in your face or increased swelling in your hands or legs.  Headaches that are not relieved by Tylenol (acetaminophen).  Changes in your vision (blurring: seeing spots or stars.)  Nausea (sick to your stomach) and vomiting (throwing up).   Weight gain of 5 pounds or more per week.  Heartburn that doesn't go away.  Signs of bladder infection: pain when you urinate (use the toilet), need to go more often and more urgently.  The bag of corona (rupture of membranes) breaks, or you notice leaking in your underwear.  Bright red blood in your underwear.  Abdominal (lower belly) or stomach pain.  For first baby: Contractions (tightening) less than 5 minutes apart for one hour or more.  Second (plus) baby: Contractions (tightening) less than 10 minutes apart and getting stronger.  *If less than 34 weeks: Contractions (tightenings) more than 6 times in one hour.  Increase or change in vaginal discharge (note the color and amount)  Other:     Follow-up:  As scheduled in the clinic

## 2021-07-10 NOTE — PLAN OF CARE
Data: Patient assessed in the [facility] for gall stone attack.  Cervical exam not examined.  Membranes intact.  Contractions/uterine assessment none.  Action:  Presumed adequate fetal oxygenation documented (see flow record). Discharge instructions reviewed.  Patient instructed to report change in fetal movement, vaginal leaking of fluid or bleeding, abdominal pain, or any concerns related to the pregnancy to her nurse/physician.    Response: Orders to discharge home per Kellie Haney.  Patient verbalized understanding of education and verbalized agreement with plan. Discharged to home at [facility].

## 2021-10-15 ENCOUNTER — HOSPITAL ENCOUNTER (EMERGENCY)
Facility: CLINIC | Age: 26
Discharge: LEFT BEFORE DISCHARGE | End: 2021-10-15
Payer: COMMERCIAL

## 2021-10-15 VITALS
TEMPERATURE: 97.3 F | DIASTOLIC BLOOD PRESSURE: 75 MMHG | RESPIRATION RATE: 18 BRPM | OXYGEN SATURATION: 99 % | HEART RATE: 86 BPM | SYSTOLIC BLOOD PRESSURE: 116 MMHG

## 2021-10-15 NOTE — ED TRIAGE NOTES
A&O x4, ABCs intact. Pt presents with rash all over. She states that it started last night and went away and it came back today. Pt tried benadryl without relief.

## 2022-10-30 ENCOUNTER — HOSPITAL ENCOUNTER (EMERGENCY)
Facility: CLINIC | Age: 27
Discharge: HOME | End: 2022-10-30
Attending: EMERGENCY MEDICINE | Admitting: EMERGENCY MEDICINE

## 2022-10-30 ENCOUNTER — APPOINTMENT (OUTPATIENT)
Dept: ULTRASOUND IMAGING | Facility: CLINIC | Age: 27
End: 2022-10-30
Attending: EMERGENCY MEDICINE

## 2022-10-30 VITALS
HEART RATE: 85 BPM | OXYGEN SATURATION: 97 % | TEMPERATURE: 97.8 F | WEIGHT: 170 LBS | SYSTOLIC BLOOD PRESSURE: 126 MMHG | DIASTOLIC BLOOD PRESSURE: 74 MMHG | BODY MASS INDEX: 31.09 KG/M2 | RESPIRATION RATE: 16 BRPM

## 2022-10-30 DIAGNOSIS — K80.20 SYMPTOMATIC CHOLELITHIASIS: ICD-10-CM

## 2022-10-30 LAB
ALBUMIN SERPL BCG-MCNC: 3.7 G/DL (ref 3.5–5.2)
ALP SERPL-CCNC: 95 U/L (ref 35–104)
ALT SERPL W P-5'-P-CCNC: 41 U/L (ref 10–35)
ANION GAP SERPL CALCULATED.3IONS-SCNC: 9 MMOL/L (ref 7–15)
AST SERPL W P-5'-P-CCNC: 66 U/L (ref 10–35)
BILIRUB DIRECT SERPL-MCNC: <0.2 MG/DL (ref 0–0.3)
BILIRUB SERPL-MCNC: 0.3 MG/DL
BUN SERPL-MCNC: 12 MG/DL (ref 6–20)
CALCIUM SERPL-MCNC: 9 MG/DL (ref 8.6–10)
CHLORIDE SERPL-SCNC: 105 MMOL/L (ref 98–107)
CREAT SERPL-MCNC: 0.64 MG/DL (ref 0.51–0.95)
DEPRECATED HCO3 PLAS-SCNC: 27 MMOL/L (ref 22–29)
ERYTHROCYTE [DISTWIDTH] IN BLOOD BY AUTOMATED COUNT: 14.8 % (ref 10–15)
GFR SERPL CREATININE-BSD FRML MDRD: >90 ML/MIN/1.73M2
GLUCOSE SERPL-MCNC: 118 MG/DL (ref 70–99)
HCG SERPL QL: NEGATIVE
HCT VFR BLD AUTO: 43.3 % (ref 35–47)
HGB BLD-MCNC: 12.9 G/DL (ref 11.7–15.7)
HOLD SPECIMEN: NORMAL
LIPASE SERPL-CCNC: 29 U/L (ref 13–60)
MCH RBC QN AUTO: 28 PG (ref 26.5–33)
MCHC RBC AUTO-ENTMCNC: 29.8 G/DL (ref 31.5–36.5)
MCV RBC AUTO: 94 FL (ref 78–100)
PLATELET # BLD AUTO: 336 10E3/UL (ref 150–450)
POTASSIUM SERPL-SCNC: 3.8 MMOL/L (ref 3.4–5.3)
PROT SERPL-MCNC: 7.3 G/DL (ref 6.4–8.3)
RBC # BLD AUTO: 4.6 10E6/UL (ref 3.8–5.2)
SODIUM SERPL-SCNC: 141 MMOL/L (ref 136–145)
TROPONIN T SERPL HS-MCNC: 7 NG/L
WBC # BLD AUTO: 9.3 10E3/UL (ref 4–11)

## 2022-10-30 PROCEDURE — 84703 CHORIONIC GONADOTROPIN ASSAY: CPT | Performed by: EMERGENCY MEDICINE

## 2022-10-30 PROCEDURE — 96375 TX/PRO/DX INJ NEW DRUG ADDON: CPT

## 2022-10-30 PROCEDURE — 82248 BILIRUBIN DIRECT: CPT | Performed by: EMERGENCY MEDICINE

## 2022-10-30 PROCEDURE — 93005 ELECTROCARDIOGRAM TRACING: CPT

## 2022-10-30 PROCEDURE — 250N000011 HC RX IP 250 OP 636: Performed by: EMERGENCY MEDICINE

## 2022-10-30 PROCEDURE — 99285 EMERGENCY DEPT VISIT HI MDM: CPT | Mod: 25

## 2022-10-30 PROCEDURE — 85027 COMPLETE CBC AUTOMATED: CPT | Performed by: EMERGENCY MEDICINE

## 2022-10-30 PROCEDURE — 76705 ECHO EXAM OF ABDOMEN: CPT

## 2022-10-30 PROCEDURE — 96374 THER/PROPH/DIAG INJ IV PUSH: CPT

## 2022-10-30 PROCEDURE — 82310 ASSAY OF CALCIUM: CPT | Performed by: EMERGENCY MEDICINE

## 2022-10-30 PROCEDURE — 83690 ASSAY OF LIPASE: CPT | Performed by: EMERGENCY MEDICINE

## 2022-10-30 PROCEDURE — 36415 COLL VENOUS BLD VENIPUNCTURE: CPT | Performed by: EMERGENCY MEDICINE

## 2022-10-30 PROCEDURE — 84484 ASSAY OF TROPONIN QUANT: CPT | Performed by: EMERGENCY MEDICINE

## 2022-10-30 RX ORDER — MORPHINE SULFATE 4 MG/ML
4 INJECTION, SOLUTION INTRAMUSCULAR; INTRAVENOUS ONCE
Status: COMPLETED | OUTPATIENT
Start: 2022-10-30 | End: 2022-10-30

## 2022-10-30 RX ORDER — ONDANSETRON 4 MG/1
4 TABLET, ORALLY DISINTEGRATING ORAL EVERY 6 HOURS PRN
Qty: 10 TABLET | Refills: 0 | Status: SHIPPED | OUTPATIENT
Start: 2022-10-30 | End: 2022-11-01

## 2022-10-30 RX ORDER — ONDANSETRON 2 MG/ML
4 INJECTION INTRAMUSCULAR; INTRAVENOUS ONCE
Status: COMPLETED | OUTPATIENT
Start: 2022-10-30 | End: 2022-10-30

## 2022-10-30 RX ORDER — OXYCODONE HYDROCHLORIDE 5 MG/1
5 TABLET ORAL EVERY 6 HOURS PRN
Qty: 12 TABLET | Refills: 0 | Status: SHIPPED | OUTPATIENT
Start: 2022-10-30 | End: 2022-11-01

## 2022-10-30 RX ADMIN — MORPHINE SULFATE 4 MG: 4 INJECTION INTRAVENOUS at 04:13

## 2022-10-30 RX ADMIN — ONDANSETRON 4 MG: 2 INJECTION INTRAMUSCULAR; INTRAVENOUS at 04:13

## 2022-10-30 ASSESSMENT — ENCOUNTER SYMPTOMS
FEVER: 0
ABDOMINAL PAIN: 1
DIAPHORESIS: 1
VOMITING: 0
DIARRHEA: 0
NAUSEA: 1

## 2022-10-30 ASSESSMENT — ACTIVITIES OF DAILY LIVING (ADL)
ADLS_ACUITY_SCORE: 35
ADLS_ACUITY_SCORE: 35

## 2022-10-30 NOTE — ED PROVIDER NOTES
History     Chief Complaint:  Abdominal Pain    HPI   Adrienne Guzman is a 27 year old female with history of cholelithiasis who presents with abdominal pain. The patient reports that tonight she had sudden onset of abdominal pain. The patient has known cholelithiasis and she states the pain is similar but worse. She endorses nausea, diaphoresis, and increased bowel urgency as well. The patient denies any vomiting, diarrhea, and fever.     Review of Systems   Constitutional: Positive for diaphoresis. Negative for fever.   Gastrointestinal: Positive for abdominal pain and nausea. Negative for diarrhea and vomiting.   All other systems reviewed and are negative.    Allergies:  The patient has no known allergies.     Medications:  Sertraline     Past Medical History:     Cholelithiasis     Past Surgical History:    The patient denies past surgical history.     Social History:  The patient presents to the ED with a family member.   PCP: Katarina Fitzpatrick     Physical Exam     Patient Vitals for the past 24 hrs:   BP Temp Temp src Pulse Resp SpO2 Weight   10/30/22 0154 130/96 97.8  F (36.6  C) Oral 84 20 100 % 77.1 kg (170 lb)     Physical Exam  Nursing note and vitals reviewed.  Constitutional: Cooperative.   HENT:   Mouth/Throat: Mucous membranes are normal.   Cardiovascular: Normal rate, regular rhythm and normal heart sounds.  No murmur.  Pulmonary/Chest: Effort normal and breath sounds normal. No respiratory distress. No wheezes.   Abdominal: Soft. Normal appearance and bowel sounds are normal. No distension. Epigastric tenderness.  There is no rigidity and no guarding.   Neurological: Alert. Oriented x4.  Skin: Skin is warm and dry.   Psychiatric: Normal mood and affect.      Emergency Department Course   ECG  ECG results from 10/30/22   EKG 12 lead     Value    Systolic Blood Pressure     Diastolic Blood Pressure     Ventricular Rate 80    Atrial Rate 80    AZ Interval 136    QRS Duration 76         QTc 426    P Axis 52    R AXIS 54    T Axis 49    Interpretation ECG Sinus rhythm with sinus arrhythmia  Normal ECG       Imaging:  US Abdomen Limited   Final Result   IMPRESSION:   1.  Cholelithiasis, without sonographic evidence of acute cholecystitis.   2.  Diffuse hepatic steatosis.   Report per radiology    Laboratory:  Labs Ordered and Resulted from Time of ED Arrival to Time of ED Departure   BASIC METABOLIC PANEL - Abnormal       Result Value    Sodium 141      Potassium 3.8      Chloride 105      Carbon Dioxide (CO2) 27      Anion Gap 9      Urea Nitrogen 12.0      Creatinine 0.64      Calcium 9.0      Glucose 118 (*)     GFR Estimate >90     CBC WITH PLATELETS - Abnormal    WBC Count 9.3      RBC Count 4.60      Hemoglobin 12.9      Hematocrit 43.3      MCV 94      MCH 28.0      MCHC 29.8 (*)     RDW 14.8      Platelet Count 336     HEPATIC FUNCTION PANEL - Abnormal    Protein Total 7.3      Albumin 3.7      Bilirubin Total 0.3      Alkaline Phosphatase 95      AST 66 (*)     ALT 41 (*)     Bilirubin Direct <0.20     LIPASE - Normal    Lipase 29     TROPONIN T, HIGH SENSITIVITY - Normal    Troponin T, High Sensitivity 7     HCG QUALITATIVE PREGNANCY - Normal    hCG Serum Qualitative Negative        Reviewed:  I reviewed nursing notes, vitals and past medical history    Assessments:  0330 I obtained history and examined the patient as noted above.   0506 I rechecked the patient and explained findings. The patient reports pain has improved.     Interventions:  Medications   ondansetron (ZOFRAN) injection 4 mg (4 mg Intravenous Given 10/30/22 0413)   morphine (PF) injection 4 mg (4 mg Intravenous Given 10/30/22 0413)      Disposition:  The patient was discharged to home.     Impression & Plan     Medical Decision Making:  The patient presents for acute onset upper abdominal pain.  Evaluation today is consistent with cholelithiasis.  Based on the location and nature of the patient's pain I feel that biliary  colic is the cause of the pain.  There is no evidence of cholecystitis on ultrasound.  Mildly elevated AST and ALT is nonspecific and lipase within normal limits.  There was no evidence of biliary obstruction or choledocholithiasis on ultrasound.  Aggressive attempts were made to control the patient's pain as documented above.  The patient's pain was resolved with these measures.  I thoroughly reexamined that patient's abdomen and there was no tenderness.  Given the improvement of the pain and the lack of findings suggestive of infection, I feel that the patient is safe for discharge and ongoing outpatient evaluation.  I recommended outpatient surgical consultation for discussion of the risks and benefits of cholecystectomy.  I discussed the need to return to the emergency department for return of severe pain, fever or vomiting.  The patient expressed good understanding.    Diagnosis:    ICD-10-CM    1. Symptomatic cholelithiasis  K80.20         Discharge Medications:  New Prescriptions    ONDANSETRON (ZOFRAN ODT) 4 MG ODT TAB    Take 1 tablet (4 mg) by mouth every 6 hours as needed for nausea    OXYCODONE (ROXICODONE) 5 MG TABLET    Take 1 tablet (5 mg) by mouth every 6 hours as needed for severe pain       Scribe Disclosure:  I, Yara Vergara, am serving as a scribe at 3:31 AM on 10/30/2022 to document services personally performed by Chao Perez MD based on my observations and the provider's statements to me.              Chao Perez MD  10/30/22 0601

## 2022-10-30 NOTE — ED TRIAGE NOTES
Pt arrives complaining of epigastric pain. States it is her gallbladder, told to have it removed one year ago and has not. This pain feels like the last time. Pt is holding upper mid abdomen in pain. Took aleve 30 mins ago with no improvement.      Triage Assessment     Row Name 10/30/22 0158       Triage Assessment (Adult)    Airway WDL WDL       Respiratory WDL    Respiratory WDL WDL       Skin Circulation/Temperature WDL    Skin Circulation/Temperature WDL WDL       Cardiac WDL    Cardiac WDL WDL       Peripheral/Neurovascular WDL    Peripheral Neurovascular WDL WDL       Cognitive/Neuro/Behavioral WDL    Cognitive/Neuro/Behavioral WDL WDL

## 2022-10-31 LAB
ATRIAL RATE - MUSE: 80 BPM
DIASTOLIC BLOOD PRESSURE - MUSE: NORMAL MMHG
INTERPRETATION ECG - MUSE: NORMAL
P AXIS - MUSE: 52 DEGREES
PR INTERVAL - MUSE: 136 MS
QRS DURATION - MUSE: 76 MS
QT - MUSE: 370 MS
QTC - MUSE: 426 MS
R AXIS - MUSE: 54 DEGREES
SYSTOLIC BLOOD PRESSURE - MUSE: NORMAL MMHG
T AXIS - MUSE: 49 DEGREES
VENTRICULAR RATE- MUSE: 80 BPM

## 2022-11-01 ENCOUNTER — HOSPITAL ENCOUNTER (OUTPATIENT)
Facility: CLINIC | Age: 27
End: 2022-11-01
Attending: SURGERY | Admitting: SURGERY
Payer: COMMERCIAL

## 2022-11-01 ENCOUNTER — TELEPHONE (OUTPATIENT)
Dept: SURGERY | Facility: CLINIC | Age: 27
End: 2022-11-01

## 2022-11-01 ENCOUNTER — OFFICE VISIT (OUTPATIENT)
Dept: SURGERY | Facility: CLINIC | Age: 27
End: 2022-11-01

## 2022-11-01 VITALS
HEART RATE: 87 BPM | OXYGEN SATURATION: 97 % | WEIGHT: 170 LBS | SYSTOLIC BLOOD PRESSURE: 106 MMHG | RESPIRATION RATE: 16 BRPM | BODY MASS INDEX: 31.28 KG/M2 | DIASTOLIC BLOOD PRESSURE: 62 MMHG | HEIGHT: 62 IN

## 2022-11-01 DIAGNOSIS — K80.20 GALLSTONES: Primary | ICD-10-CM

## 2022-11-01 PROCEDURE — 99214 OFFICE O/P EST MOD 30 MIN: CPT | Performed by: SURGERY

## 2022-11-01 RX ORDER — INDOCYANINE GREEN AND WATER 25 MG
2.5 KIT INJECTION ONCE
Status: CANCELLED | OUTPATIENT
Start: 2022-11-01 | End: 2022-11-01

## 2022-11-01 NOTE — PROGRESS NOTES
[facility]  New Patient Office Visit      Adrienne Guzman is a 27 year old female seen in consultation for gallstones at the request of ER physician    Assessment and Plan:  It is my impression that Adrienne has symptomatic gallstones.   I have offered her a laparoscopic cholecystectomy.      We have discussed the indication, alternatives, risks and expected recovery.  Specifically we have discussed incisions, scarring, postoperative infections, anesthesia, bleeding,b ile duct injury, injury to intra-abdominal organs, bile leak, DVT, hernia, post cholecystectomy diarrhea, postoperative dietary restrictions and physical limitations.  We have discussed the recommended interventions and treatments for these complications.  All questions have been answered to the best of my ability.         This will serve as preop H&P.  She is cleared for surgery with no further testing.                    Chief complaint:  Abdominal pain    HPI:  Adrienne Gumzan is a 27 year old female who presents for gallstones.    Has had episodes of pain for the last 2 years.  If she watches what she eats has pain every couple months otherwise in the last couple months pain has been more frequent.  Almost all foods now cause the pain. Has nausea, no vomiting. Gets the urge to have a BM, but no diarrhea. Has hot sweats during episodes as well. No jaundice or light colored stools.     Mom had gallbladder removed and had issues with her incision requiring repeat surgery and still has pain, so Adrienne has put off surgery due to fear of similar complications    She was seen recently in the ED and I have reviewed their documentation/notes    Past Medical History:  Past Medical History:   Diagnosis Date     Anxiety      Asthma     Uses rescue inhaler     Cholelithiasis 12/2020    Planning to remove gallstones after pregnancy     Depressive disorder      Gastroesophageal reflux disease      Stomach ulcer 12/2020     "R/t H. pylori infection, managing symptoms with antibiotics + diet modification       Past Surgical History:  IUD removal under anesthesia 2020    Social History:  Social History     Tobacco Use     Smoking status: Never     Smokeless tobacco: Never   Substance Use Topics     Alcohol use: No     Drug use: No    stays home with kids ages 6, 4, 1    Family History:  No family history on file.  Mother had gallbladder removed  Grandma and aunt had breast cancer     Review of Systems:  The 10 point review of systems is negative other than noted in the HPI and above.    Physical Exam:  Blood pressure 106/62, pulse 87, resp. rate 16, height 1.575 m (5' 2\"), weight 77.1 kg (170 lb), SpO2 97 %, not currently breastfeeding.  Vitals: /62   Pulse 87   Resp 16   Ht 1.575 m (5' 2\")   Wt 77.1 kg (170 lb)   SpO2 97%   BMI 31.09 kg/m    BMI= Body mass index is 31.09 kg/m .    Constitutional:  This is a well developed, well nourished woman in no apparent distress.  Eyes:  no scleral icterus or redness.  Resp - clear to auscultation bilaterally  CV - Regular rate & rhythm without murmur  GI:   soft, non-distended, non-tender and no masses palpated. Stretch marks around umbilicus with prior umbilical ring site closed   MSK- extremities warm without edema  Skin - no rashes, no lesions  Neurologic - non-focal, moves extremities symmetrically, grossly normal strength and sensation  Psych - normal affect          Relevant labs:    WBC -   Lab Results   Component Value Date    WBC 9.3 10/30/2022       HgB -   Lab Results   Component Value Date    HGB 12.9 10/30/2022       Plt-   Lab Results   Component Value Date     10/30/2022       Liver Function Studies -   Recent Labs   Lab Test 10/30/22  0252   PROTTOTAL 7.3   ALBUMIN 3.7   BILITOTAL 0.3   ALKPHOS 95   AST 66*   ALT 41*       Lipase-   Lab Results   Component Value Date    LIPASE 29 10/30/2022           Imaging:    All imaging studies reviewed by me.    Recent " Results (from the past 744 hour(s))   US Abdomen Limited    Narrative    EXAM: US ABDOMEN LIMITED  LOCATION: [facility]  DATE/TIME: 10/30/2022 4:12 AM    INDICATION: Right upper quadrant pain.  COMPARISON: 11/23/2020.  TECHNIQUE: Limited abdominal ultrasound.    FINDINGS:    GALLBLADDER: Cholelithiasis is present. Gallbladder is not enlarged. There is no gallbladder wall thickening, pericholecystic fluid, or a positive sonographic Dumont's sign.    BILE DUCTS: No biliary dilatation. The common duct measures 6 mm.    LIVER: Diffuse hepatic steatosis. Focal sparing near the gallbladder fossa.    RIGHT KIDNEY: No hydronephrosis.    PANCREAS: The visualized due to overlying bowel gas.    Visualized proximal aorta is unremarkable.      Impression    IMPRESSION:  1.  Cholelithiasis, without sonographic evidence of acute cholecystitis.  2.  Diffuse hepatic steatosis.             This note was created using voice recognition software. Undetected word substitutions or other errors may have occurred.     30 minutes spent on the date of the encounter doing chart review, history and exam, documentation and further activities as noted above      Gisselle Araya MD  [facility], [place]    Please route or send letter to:  Primary Care Provider (PCP)

## 2022-11-01 NOTE — TELEPHONE ENCOUNTER
Type of surgery: LAPAROSCOPIC CHOLECYSTECTOMY     Location of surgery: [facility]  Date and time of surgery: 11/28/2022 @ 10:05 AM   Surgeon: Gisselle Araya MD    Pre-Op Appt Date: .11/1/2022 JLS   Post-Op Appt Date: PATIENT TO SCHEDULE     Packet sent out: Yes  Pre-cert/Authorization completed:  Not Applicable  Date: 11/1/2022       LAPAROSCOPIC CHOLECYSTECTOMY     GENERAL H&P DONE JLS 11/1/2022 690 MIN REQ PA ASSIST JLS NMS

## 2022-11-01 NOTE — LETTER
[facility]    [address], [address]  [place], [place] [address]  Phone [phone]  Fax [phone]    [address], [address]  [place] [address]  Phone [phone]  Fax [phone]    [email or web address]   2022       Adrienne Diegoncgilbert Guzman    RE: [id number]  : 1995    Adrienne Guzman has been scheduled for surgery on 2022 at 10:00 AM  at [facility] with Dr Gisselle Araya.  The hospital is located at [address] in [place].      Please check in at the Surgery reception desk at 8:00 AM . This is located in the back of the [facility] on the East side, just past the Emergency Room entrance.       DO NOT EAT OR DRINK ANYTHING AFTER MIDNIGHT THE NIGHT BEFORE YOUR  SURGERY.   You may have sips of clear liquids up until 2 hours before your surgery.   If you have been advised to take your medication, please do this early in the morning with just sips of clear liquid.        Hospital regulations require an updated pre-operative examination to be completed within 30 days of the procedure. This can be done by your primary care provider. Please ask them to fax documentation to [phone]. We also recommend you bring a copy with you.       During the current pandemic, a COVID-19 at home rapid antigen test is required   1-2 days before your procedure per hospital regulations. You can buy these at many pharmacy stores. Or you can order free, at-home tests at covid.gov/tests  ? If your test is negative, please take a photo of your test. Show the photo to the nurse when you come in for your procedure.  ? If your test is positive, please call our office at [phone].      You should shower before your surgery with Hibiclens or Exidine soap.  This can be found at your local pharmacy or you can pick it up from our office for free.  Please call our office if you  have any questions.       You will be required to have an Adult (friend or family member) drive you home after your surgery and arrange for an adult to stay with you until the next morning.       You will receive several calls from our staff 3-7 days prior to your scheduled procedure with further details and to answer any questions you may have.      It is sometimes necessary to adjust the surgery schedule due to emergencies and additions to the schedule.  If your surgery is affected by this, we greatly appreciate your flexibility and understanding in this matter            It is best if you call regarding post-operative questions between the hours of 8:00 am & 3:00 pm Monday-Friday, so you have access to the daytime care team that know you best.  ? Prescription refills are accepted during regular office hours only.      Please do not bring any Disability or FMLA papers to the hospital.  They need to be either faxed ([phone]), mailed or hand delivered to our office by you or a family member for completion.  ? Please allow 14 business days to complete paperwork.        If you have questions or concerns, please contact our office at [phone].

## 2023-05-17 ENCOUNTER — HOSPITAL ENCOUNTER (EMERGENCY)
Facility: CLINIC | Age: 28
Discharge: HOME | End: 2023-05-17
Attending: PHYSICIAN ASSISTANT | Admitting: PHYSICIAN ASSISTANT

## 2023-05-17 ENCOUNTER — APPOINTMENT (OUTPATIENT)
Dept: GENERAL RADIOLOGY | Facility: CLINIC | Age: 28
End: 2023-05-17
Attending: PHYSICIAN ASSISTANT

## 2023-05-17 VITALS
OXYGEN SATURATION: 100 % | WEIGHT: 163 LBS | HEIGHT: 62 IN | BODY MASS INDEX: 30 KG/M2 | HEART RATE: 90 BPM | RESPIRATION RATE: 18 BRPM | DIASTOLIC BLOOD PRESSURE: 78 MMHG | TEMPERATURE: 97.3 F | SYSTOLIC BLOOD PRESSURE: 111 MMHG

## 2023-05-17 DIAGNOSIS — M79.661 PAIN OF RIGHT LOWER LEG: ICD-10-CM
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
## 2023-05-17 PROCEDURE — 99285 EMERGENCY DEPT VISIT HI MDM: CPT

## 2023-05-17 PROCEDURE — 73630 X-RAY EXAM OF FOOT: CPT | Mod: RT

## 2023-05-17 PROCEDURE — 73502 X-RAY EXAM HIP UNI 2-3 VIEWS: CPT

## 2023-05-17 PROCEDURE — 73610 X-RAY EXAM OF ANKLE: CPT | Mod: RT

## 2023-05-17 PROCEDURE — 250N000013 HC RX MED GY IP 250 OP 250 PS 637: Performed by: EMERGENCY MEDICINE

## 2023-05-17 PROCEDURE — 73562 X-RAY EXAM OF KNEE 3: CPT | Mod: RT

## 2023-05-17 RX ORDER — ACETAMINOPHEN 325 MG/1
975 TABLET ORAL ONCE
Status: COMPLETED | OUTPATIENT
Start: 2023-05-17 | End: 2023-05-17

## 2023-05-17 RX ADMIN — ACETAMINOPHEN 975 MG: 325 TABLET ORAL at 19:32

## 2023-05-17 ASSESSMENT — ACTIVITIES OF DAILY LIVING (ADL): ADLS_ACUITY_SCORE: 35

## 2023-05-17 ASSESSMENT — ENCOUNTER SYMPTOMS
ARTHRALGIAS: 1
BACK PAIN: 1
MYALGIAS: 1

## 2023-05-18 NOTE — ED TRIAGE NOTES
Pt arrives to the ED due to right ankle pain. Pt states she fell last night after tripping on clothing  on a hardwood floor. Bruising noted to right ankle area.

## 2023-05-18 NOTE — ED PROVIDER NOTES
"  History     Chief Complaint:  Ankle Pain       HPI   Adrienne Guzman is a 27 year old female who presents with right-sided lower extremity pain in multiple locations. The patient reports that last night she tripped on a clothing  on the floor and is unsure of how she fell and where exactly she landed. Adrienne endorses pain that runs from the lower-right side of her back to the bottom of her right leg. She notes that last night her pain radiated to her front side, making it difficult for her to breathe. She primarily feels the most pain at the top of her right foot, right knee, right ankle, and right hip. The patient is not on any blood thinners or any medications at all. Adrienne does not suspect pregnancy.    Independent Historian:   None - Patient Only    Review of External Notes: None     ROS:  Review of Systems   Musculoskeletal: Positive for arthralgias (right knee, right ankle, right hip), back pain (lower-right) and myalgias (right lower extremity).        (+) Right foot pain   All other systems reviewed and are negative.      Allergies:  No Known Allergies     Medications:    The patient is not currently taking any prescribed medications.    Past Medical History:    Anxiety  Asthma  Cholelithiasis  Depression  GERD  Stomach ulcer    Social History:  Patient reports that she has never smoked. She has never used smokeless tobacco. She reports that she does not drink alcohol and does not use drugs.  Patient arrives by private vehicle alone  PCP: Katarina Fitzpatrick     Physical Exam     Patient Vitals for the past 24 hrs:   BP Temp Temp src Pulse Resp SpO2 Height Weight   05/17/23 1928 120/72 97.3  F (36.3  C) Temporal 90 20 100 % 1.575 m (5' 2\") 73.9 kg (163 lb)        Physical Exam  HENT: mmm  Eyes: PERRL B/L  Neck: Supple  CV: Peripheral pulses in tact and regular  Resp: Speaking in full sentences without any respiratory distress  Ext:  Right lower extremity  TTP to right SI joint, right hip, " right knee diffusely, right ankle diffusely, as well as dorsum of right foot.  No other bony TTP.  Decreased ROM of right lower extremity secondary to pain.  No obvious swelling.  2+ DP pulse  Sensation intact distally.  Remainder of the skeletal survey is unremarkable  Skin: warm dry well perfused  Neuro: Alert, no gross motor or sensory deficits  Psych: Calm  Nursing notes and vital signs reviewed.      Emergency Department Course     Imaging:  XR Pelvis w Hip Right 1 View   Final Result   IMPRESSION: Anatomic alignment right hip. No acute displaced right hip fracture. Normal and symmetric hip joint spaces. No acute displaced pelvic fracture identified. Mild arthritis at the symphysis pubis.      Foot  XR, G/E 3 views, right   Final Result   IMPRESSION: There is no evidence of an acute right foot or ankle fracture. Ankle mortise is intact. Joint spaces of the foot are maintained. No Lisfranc subluxation.      XR Knee Right 3 Views   Final Result   IMPRESSION: There is no definitive evidence of an acute knee fracture. No joint space narrowing or joint effusion. Small nonspecific area of curvilinear mineralization measuring 4 mm along the posterior distal femur.      XR Ankle Right G/E 3 Views   Final Result   IMPRESSION: There is no evidence of an acute right foot or ankle fracture. Ankle mortise is intact. Joint spaces of the foot are maintained. No Lisfranc subluxation.         Report per radiology     Emergency Department Course & Assessments:    Interventions:  Medications   acetaminophen (TYLENOL) tablet 975 mg (975 mg Oral $Given 5/17/23 1932)        Assessments:  2003 I obtained history and examined the patient as noted above    Independent Interpretation (X-rays, CTs, rhythm strip):  I personally reviewed the foot XR, no obvious fracture.    Consultations/Discussion of Management or Tests:  None     Social Determinants of Health affecting care:   None    Disposition:  The patient was discharged to home.      Impression & Plan      Medical Decision Making:  Adrienne Guzman is a 27 year old female who presents to the ED for evaluation after a fall.  This was a mechanical slip and fall.  No preceding symptoms.  Patient landed on her right lower extremity and has had pain with ambulation since that time.  See HPI as above for additional details.  Vitals and physical exam as above.  Imaging obtained as above is negative for acute bony abnormality.  Suspect soft tissue injury such as contusions.  Advised Tylenol and ibuprofen for pain, ice, rest.  Felt patient was safe for discharge to home.  Follow-up with orthopedics with persistent symptoms. Discussed reasons to return. All questions answered. Patient discharged to home in stable condition.    Diagnosis:    ICD-10-CM    1. Fall, initial encounter  W19.XXXA       2. Pain of right lower leg  M79.661              Scribe Disclosure:  I, Cristian Garcia, am serving as a scribe at 7:46 PM on 5/17/2023 to document services personally performed by Justin Villarreal PA-C based on my observations and the provider's statements to me.   5/17/2023   Justin Villarreal PA-C     This record was created at least in part using electronic voice recognition software, so please excuse any typographical errors.       Justin Villarreal PA-C  05/18/23 0032

## 2023-05-18 NOTE — DISCHARGE INSTRUCTIONS
For pain, you may take Tylenol 1000mg every 8 hours and ibuprofen 400mg every 6 hours for pain.  Use ice for pain.

## 2023-08-05 ENCOUNTER — HOSPITAL ENCOUNTER (OUTPATIENT)
Facility: CLINIC | Age: 28
Setting detail: OBSERVATION
Discharge: HOME | End: 2023-08-06
Attending: EMERGENCY MEDICINE | Admitting: INTERNAL MEDICINE

## 2023-08-05 ENCOUNTER — APPOINTMENT (OUTPATIENT)
Dept: ULTRASOUND IMAGING | Facility: CLINIC | Age: 28
End: 2023-08-05
Attending: EMERGENCY MEDICINE

## 2023-08-05 DIAGNOSIS — Z90.49 S/P LAPAROSCOPIC CHOLECYSTECTOMY: Primary | ICD-10-CM

## 2023-08-05 DIAGNOSIS — K80.50 BILIARY COLIC: ICD-10-CM

## 2023-08-05 LAB
ALBUMIN SERPL BCG-MCNC: 4.1 G/DL (ref 3.5–5.2)
ALBUMIN UR-MCNC: NEGATIVE MG/DL
ALP SERPL-CCNC: 104 U/L (ref 35–104)
ALT SERPL W P-5'-P-CCNC: 78 U/L (ref 0–50)
ANION GAP SERPL CALCULATED.3IONS-SCNC: 9 MMOL/L (ref 7–15)
APPEARANCE UR: CLEAR
AST SERPL W P-5'-P-CCNC: 57 U/L (ref 0–45)
BACTERIA #/AREA URNS HPF: ABNORMAL /HPF
BASOPHILS # BLD AUTO: 0 10E3/UL (ref 0–0.2)
BASOPHILS NFR BLD AUTO: 0 %
BILIRUB DIRECT SERPL-MCNC: <0.2 MG/DL (ref 0–0.3)
BILIRUB SERPL-MCNC: 0.5 MG/DL
BILIRUB UR QL STRIP: NEGATIVE
BUN SERPL-MCNC: 7.4 MG/DL (ref 6–20)
CALCIUM SERPL-MCNC: 9.1 MG/DL (ref 8.6–10)
CHLORIDE SERPL-SCNC: 103 MMOL/L (ref 98–107)
COLOR UR AUTO: ABNORMAL
CREAT SERPL-MCNC: 0.65 MG/DL (ref 0.51–0.95)
DEPRECATED HCO3 PLAS-SCNC: 26 MMOL/L (ref 22–29)
EOSINOPHIL # BLD AUTO: 0.1 10E3/UL (ref 0–0.7)
EOSINOPHIL NFR BLD AUTO: 1 %
ERYTHROCYTE [DISTWIDTH] IN BLOOD BY AUTOMATED COUNT: 14 % (ref 10–15)
GFR SERPL CREATININE-BSD FRML MDRD: >90 ML/MIN/1.73M2
GLUCOSE SERPL-MCNC: 91 MG/DL (ref 70–99)
GLUCOSE UR STRIP-MCNC: NEGATIVE MG/DL
HCG SERPL QL: NEGATIVE
HCT VFR BLD AUTO: 41.9 % (ref 35–47)
HGB BLD-MCNC: 13.6 G/DL (ref 11.7–15.7)
HGB UR QL STRIP: ABNORMAL
HOLD SPECIMEN: NORMAL
HOLD SPECIMEN: NORMAL
IMM GRANULOCYTES # BLD: 0 10E3/UL
IMM GRANULOCYTES NFR BLD: 0 %
KETONES UR STRIP-MCNC: NEGATIVE MG/DL
LEUKOCYTE ESTERASE UR QL STRIP: NEGATIVE
LIPASE SERPL-CCNC: 19 U/L (ref 13–60)
LYMPHOCYTES # BLD AUTO: 2.3 10E3/UL (ref 0.8–5.3)
LYMPHOCYTES NFR BLD AUTO: 33 %
MCH RBC QN AUTO: 30.1 PG (ref 26.5–33)
MCHC RBC AUTO-ENTMCNC: 32.5 G/DL (ref 31.5–36.5)
MCV RBC AUTO: 93 FL (ref 78–100)
MONOCYTES # BLD AUTO: 0.7 10E3/UL (ref 0–1.3)
MONOCYTES NFR BLD AUTO: 10 %
NEUTROPHILS # BLD AUTO: 3.8 10E3/UL (ref 1.6–8.3)
NEUTROPHILS NFR BLD AUTO: 56 %
NITRATE UR QL: NEGATIVE
NRBC # BLD AUTO: 0 10E3/UL
NRBC BLD AUTO-RTO: 0 /100
PH UR STRIP: 7.5 [PH] (ref 5–7)
PLATELET # BLD AUTO: 325 10E3/UL (ref 150–450)
POTASSIUM SERPL-SCNC: 3.9 MMOL/L (ref 3.4–5.3)
PROT SERPL-MCNC: 7.5 G/DL (ref 6.4–8.3)
RBC # BLD AUTO: 4.52 10E6/UL (ref 3.8–5.2)
RBC URINE: >100 /HPF
SODIUM SERPL-SCNC: 138 MMOL/L (ref 136–145)
SP GR UR STRIP: 1.01 (ref 1–1.03)
UROBILINOGEN UR STRIP-MCNC: NORMAL MG/DL
WBC # BLD AUTO: 6.8 10E3/UL (ref 4–11)
WBC URINE: 5 /HPF

## 2023-08-05 PROCEDURE — 81001 URINALYSIS AUTO W/SCOPE: CPT | Performed by: EMERGENCY MEDICINE

## 2023-08-05 PROCEDURE — 36415 COLL VENOUS BLD VENIPUNCTURE: CPT | Performed by: EMERGENCY MEDICINE

## 2023-08-05 PROCEDURE — G0378 HOSPITAL OBSERVATION PER HR: HCPCS

## 2023-08-05 PROCEDURE — 80053 COMPREHEN METABOLIC PANEL: CPT | Performed by: EMERGENCY MEDICINE

## 2023-08-05 PROCEDURE — 83690 ASSAY OF LIPASE: CPT | Performed by: EMERGENCY MEDICINE

## 2023-08-05 PROCEDURE — 99285 EMERGENCY DEPT VISIT HI MDM: CPT | Mod: 25

## 2023-08-05 PROCEDURE — 85025 COMPLETE CBC W/AUTO DIFF WBC: CPT | Performed by: EMERGENCY MEDICINE

## 2023-08-05 PROCEDURE — 84703 CHORIONIC GONADOTROPIN ASSAY: CPT | Performed by: EMERGENCY MEDICINE

## 2023-08-05 PROCEDURE — 82248 BILIRUBIN DIRECT: CPT | Performed by: EMERGENCY MEDICINE

## 2023-08-05 PROCEDURE — 99221 1ST HOSP IP/OBS SF/LOW 40: CPT | Performed by: INTERNAL MEDICINE

## 2023-08-05 PROCEDURE — 76705 ECHO EXAM OF ABDOMEN: CPT

## 2023-08-05 RX ORDER — HYDROXYZINE HYDROCHLORIDE 50 MG/1
50-100 TABLET, FILM COATED ORAL 3 TIMES DAILY PRN
Status: DISCONTINUED | OUTPATIENT
Start: 2023-08-05 | End: 2023-08-06 | Stop reason: HOSPADM

## 2023-08-05 RX ORDER — HYDROXYZINE HYDROCHLORIDE 50 MG/1
50-100 TABLET, FILM COATED ORAL 3 TIMES DAILY PRN
COMMUNITY

## 2023-08-05 RX ORDER — DOCUSATE SODIUM 100 MG/1
100 CAPSULE, LIQUID FILLED ORAL 2 TIMES DAILY
Status: DISCONTINUED | OUTPATIENT
Start: 2023-08-06 | End: 2023-08-06 | Stop reason: HOSPADM

## 2023-08-05 RX ORDER — LANOLIN ALCOHOL/MO/W.PET/CERES
3 CREAM (GRAM) TOPICAL
Status: DISCONTINUED | OUTPATIENT
Start: 2023-08-05 | End: 2023-08-06 | Stop reason: HOSPADM

## 2023-08-05 RX ORDER — PROCHLORPERAZINE 25 MG
25 SUPPOSITORY, RECTAL RECTAL EVERY 12 HOURS PRN
Status: DISCONTINUED | OUTPATIENT
Start: 2023-08-05 | End: 2023-08-06 | Stop reason: HOSPADM

## 2023-08-05 RX ORDER — CITALOPRAM HYDROBROMIDE 10 MG/1
10 TABLET ORAL DAILY
Status: DISCONTINUED | OUTPATIENT
Start: 2023-08-06 | End: 2023-08-06 | Stop reason: HOSPADM

## 2023-08-05 RX ORDER — OXYCODONE HYDROCHLORIDE 5 MG/1
5 TABLET ORAL DAILY PRN
COMMUNITY

## 2023-08-05 RX ORDER — ALBUTEROL SULFATE 90 UG/1
2 AEROSOL, METERED RESPIRATORY (INHALATION) EVERY 6 HOURS PRN
Status: DISCONTINUED | OUTPATIENT
Start: 2023-08-05 | End: 2023-08-06 | Stop reason: HOSPADM

## 2023-08-05 RX ORDER — BUPROPION HYDROCHLORIDE 150 MG/1
300 TABLET ORAL DAILY
Status: DISCONTINUED | OUTPATIENT
Start: 2023-08-06 | End: 2023-08-06 | Stop reason: HOSPADM

## 2023-08-05 RX ORDER — BUPROPION HYDROCHLORIDE 300 MG/1
300 TABLET ORAL DAILY
COMMUNITY

## 2023-08-05 RX ORDER — HYDROMORPHONE HYDROCHLORIDE 1 MG/ML
0.5 INJECTION, SOLUTION INTRAMUSCULAR; INTRAVENOUS; SUBCUTANEOUS ONCE
Status: DISCONTINUED | OUTPATIENT
Start: 2023-08-05 | End: 2023-08-05

## 2023-08-05 RX ORDER — ONDANSETRON 2 MG/ML
4 INJECTION INTRAMUSCULAR; INTRAVENOUS ONCE
Status: DISCONTINUED | OUTPATIENT
Start: 2023-08-05 | End: 2023-08-05

## 2023-08-05 RX ORDER — ONDANSETRON 4 MG/1
4 TABLET, ORALLY DISINTEGRATING ORAL EVERY 6 HOURS PRN
Status: DISCONTINUED | OUTPATIENT
Start: 2023-08-05 | End: 2023-08-06 | Stop reason: HOSPADM

## 2023-08-05 RX ORDER — ONDANSETRON 2 MG/ML
4 INJECTION INTRAMUSCULAR; INTRAVENOUS EVERY 6 HOURS PRN
Status: DISCONTINUED | OUTPATIENT
Start: 2023-08-05 | End: 2023-08-06 | Stop reason: HOSPADM

## 2023-08-05 RX ORDER — PROCHLORPERAZINE MALEATE 5 MG
10 TABLET ORAL EVERY 6 HOURS PRN
Status: DISCONTINUED | OUTPATIENT
Start: 2023-08-05 | End: 2023-08-06 | Stop reason: HOSPADM

## 2023-08-05 RX ORDER — ALBUTEROL SULFATE 90 UG/1
2 AEROSOL, METERED RESPIRATORY (INHALATION) EVERY 6 HOURS PRN
COMMUNITY

## 2023-08-05 RX ORDER — OXYCODONE HYDROCHLORIDE 5 MG/1
5 TABLET ORAL DAILY PRN
Status: DISCONTINUED | OUTPATIENT
Start: 2023-08-05 | End: 2023-08-06 | Stop reason: HOSPADM

## 2023-08-05 RX ORDER — CITALOPRAM HYDROBROMIDE 10 MG/1
10 TABLET ORAL DAILY
COMMUNITY

## 2023-08-05 RX ORDER — KETOROLAC TROMETHAMINE 15 MG/ML
15 INJECTION, SOLUTION INTRAMUSCULAR; INTRAVENOUS ONCE
Status: DISCONTINUED | OUTPATIENT
Start: 2023-08-05 | End: 2023-08-05

## 2023-08-05 ASSESSMENT — ACTIVITIES OF DAILY LIVING (ADL)
ADLS_ACUITY_SCORE: 35
ADLS_ACUITY_SCORE: 35

## 2023-08-06 ENCOUNTER — ANESTHESIA EVENT (OUTPATIENT)
Dept: SURGERY | Facility: CLINIC | Age: 28
End: 2023-08-06

## 2023-08-06 ENCOUNTER — ANESTHESIA (OUTPATIENT)
Dept: SURGERY | Facility: CLINIC | Age: 28
End: 2023-08-06

## 2023-08-06 VITALS
HEIGHT: 62 IN | WEIGHT: 157.2 LBS | OXYGEN SATURATION: 98 % | TEMPERATURE: 97.1 F | DIASTOLIC BLOOD PRESSURE: 64 MMHG | BODY MASS INDEX: 28.93 KG/M2 | RESPIRATION RATE: 14 BRPM | HEART RATE: 75 BPM | SYSTOLIC BLOOD PRESSURE: 103 MMHG

## 2023-08-06 PROBLEM — K80.20 CHOLELITHIASIS: Status: ACTIVE | Noted: 2023-08-06

## 2023-08-06 PROBLEM — Z90.49 S/P LAPAROSCOPIC CHOLECYSTECTOMY: Status: ACTIVE | Noted: 2023-08-06

## 2023-08-06 LAB
ERYTHROCYTE [DISTWIDTH] IN BLOOD BY AUTOMATED COUNT: 13.9 % (ref 10–15)
HCT VFR BLD AUTO: 39.1 % (ref 35–47)
HGB BLD-MCNC: 12.9 G/DL (ref 11.7–15.7)
MCH RBC QN AUTO: 30.3 PG (ref 26.5–33)
MCHC RBC AUTO-ENTMCNC: 33 G/DL (ref 31.5–36.5)
MCV RBC AUTO: 92 FL (ref 78–100)
PLATELET # BLD AUTO: 308 10E3/UL (ref 150–450)
RBC # BLD AUTO: 4.26 10E6/UL (ref 3.8–5.2)
WBC # BLD AUTO: 7.8 10E3/UL (ref 4–11)

## 2023-08-06 PROCEDURE — 250N000011 HC RX IP 250 OP 636: Performed by: STUDENT IN AN ORGANIZED HEALTH CARE EDUCATION/TRAINING PROGRAM

## 2023-08-06 PROCEDURE — 250N000013 HC RX MED GY IP 250 OP 250 PS 637: Performed by: INTERNAL MEDICINE

## 2023-08-06 PROCEDURE — 250N000009 HC RX 250: Performed by: NURSE ANESTHETIST, CERTIFIED REGISTERED

## 2023-08-06 PROCEDURE — 258N000003 HC RX IP 258 OP 636: Performed by: NURSE ANESTHETIST, CERTIFIED REGISTERED

## 2023-08-06 PROCEDURE — 710N000009 HC RECOVERY PHASE 1, LEVEL 1, PER MIN: Performed by: STUDENT IN AN ORGANIZED HEALTH CARE EDUCATION/TRAINING PROGRAM

## 2023-08-06 PROCEDURE — 710N000012 HC RECOVERY PHASE 2, PER MINUTE: Performed by: STUDENT IN AN ORGANIZED HEALTH CARE EDUCATION/TRAINING PROGRAM

## 2023-08-06 PROCEDURE — 272N000001 HC OR GENERAL SUPPLY STERILE: Performed by: STUDENT IN AN ORGANIZED HEALTH CARE EDUCATION/TRAINING PROGRAM

## 2023-08-06 PROCEDURE — 999N000127 HC STATISTIC PERIPHERAL IV START W US GUIDANCE

## 2023-08-06 PROCEDURE — 370N000017 HC ANESTHESIA TECHNICAL FEE, PER MIN: Performed by: STUDENT IN AN ORGANIZED HEALTH CARE EDUCATION/TRAINING PROGRAM

## 2023-08-06 PROCEDURE — 88304 TISSUE EXAM BY PATHOLOGIST: CPT | Mod: TC | Performed by: STUDENT IN AN ORGANIZED HEALTH CARE EDUCATION/TRAINING PROGRAM

## 2023-08-06 PROCEDURE — 250N000025 HC SEVOFLURANE, PER MIN: Performed by: STUDENT IN AN ORGANIZED HEALTH CARE EDUCATION/TRAINING PROGRAM

## 2023-08-06 PROCEDURE — 250N000013 HC RX MED GY IP 250 OP 250 PS 637: Performed by: STUDENT IN AN ORGANIZED HEALTH CARE EDUCATION/TRAINING PROGRAM

## 2023-08-06 PROCEDURE — 250N000011 HC RX IP 250 OP 636: Performed by: NURSE ANESTHETIST, CERTIFIED REGISTERED

## 2023-08-06 PROCEDURE — 47562 LAPAROSCOPIC CHOLECYSTECTOMY: CPT | Performed by: STUDENT IN AN ORGANIZED HEALTH CARE EDUCATION/TRAINING PROGRAM

## 2023-08-06 PROCEDURE — 250N000009 HC RX 250: Performed by: STUDENT IN AN ORGANIZED HEALTH CARE EDUCATION/TRAINING PROGRAM

## 2023-08-06 PROCEDURE — 85027 COMPLETE CBC AUTOMATED: CPT | Performed by: INTERNAL MEDICINE

## 2023-08-06 PROCEDURE — 258N000001 HC RX 258: Performed by: STUDENT IN AN ORGANIZED HEALTH CARE EDUCATION/TRAINING PROGRAM

## 2023-08-06 PROCEDURE — 360N000076 HC SURGERY LEVEL 3, PER MIN: Performed by: STUDENT IN AN ORGANIZED HEALTH CARE EDUCATION/TRAINING PROGRAM

## 2023-08-06 PROCEDURE — 999N000141 HC STATISTIC PRE-PROCEDURE NURSING ASSESSMENT: Performed by: STUDENT IN AN ORGANIZED HEALTH CARE EDUCATION/TRAINING PROGRAM

## 2023-08-06 PROCEDURE — 36415 COLL VENOUS BLD VENIPUNCTURE: CPT | Performed by: INTERNAL MEDICINE

## 2023-08-06 PROCEDURE — 88304 TISSUE EXAM BY PATHOLOGIST: CPT | Mod: 26 | Performed by: PATHOLOGY

## 2023-08-06 PROCEDURE — G0378 HOSPITAL OBSERVATION PER HR: HCPCS

## 2023-08-06 PROCEDURE — 99222 1ST HOSP IP/OBS MODERATE 55: CPT | Mod: 57 | Performed by: STUDENT IN AN ORGANIZED HEALTH CARE EDUCATION/TRAINING PROGRAM

## 2023-08-06 PROCEDURE — 99238 HOSP IP/OBS DSCHRG MGMT 30/<: CPT | Performed by: INTERNAL MEDICINE

## 2023-08-06 RX ORDER — FENTANYL CITRATE 50 UG/ML
25 INJECTION, SOLUTION INTRAMUSCULAR; INTRAVENOUS EVERY 5 MIN PRN
Status: DISCONTINUED | OUTPATIENT
Start: 2023-08-06 | End: 2023-08-06 | Stop reason: HOSPADM

## 2023-08-06 RX ORDER — HYDROMORPHONE HCL IN WATER/PF 6 MG/30 ML
0.4 PATIENT CONTROLLED ANALGESIA SYRINGE INTRAVENOUS EVERY 5 MIN PRN
Status: DISCONTINUED | OUTPATIENT
Start: 2023-08-06 | End: 2023-08-06 | Stop reason: HOSPADM

## 2023-08-06 RX ORDER — LIDOCAINE 40 MG/G
CREAM TOPICAL
Status: DISCONTINUED | OUTPATIENT
Start: 2023-08-06 | End: 2023-08-06 | Stop reason: HOSPADM

## 2023-08-06 RX ORDER — CEFAZOLIN SODIUM/WATER 2 G/20 ML
2 SYRINGE (ML) INTRAVENOUS SEE ADMIN INSTRUCTIONS
Status: DISCONTINUED | OUTPATIENT
Start: 2023-08-06 | End: 2023-08-06 | Stop reason: HOSPADM

## 2023-08-06 RX ORDER — FENTANYL CITRATE 50 UG/ML
INJECTION, SOLUTION INTRAMUSCULAR; INTRAVENOUS PRN
Status: DISCONTINUED | OUTPATIENT
Start: 2023-08-06 | End: 2023-08-06

## 2023-08-06 RX ORDER — HYDRALAZINE HYDROCHLORIDE 20 MG/ML
2.5-5 INJECTION INTRAMUSCULAR; INTRAVENOUS EVERY 10 MIN PRN
Status: DISCONTINUED | OUTPATIENT
Start: 2023-08-06 | End: 2023-08-06 | Stop reason: HOSPADM

## 2023-08-06 RX ORDER — DEXAMETHASONE SODIUM PHOSPHATE 4 MG/ML
INJECTION, SOLUTION INTRA-ARTICULAR; INTRALESIONAL; INTRAMUSCULAR; INTRAVENOUS; SOFT TISSUE PRN
Status: DISCONTINUED | OUTPATIENT
Start: 2023-08-06 | End: 2023-08-06

## 2023-08-06 RX ORDER — FENTANYL CITRATE 50 UG/ML
50 INJECTION, SOLUTION INTRAMUSCULAR; INTRAVENOUS EVERY 5 MIN PRN
Status: DISCONTINUED | OUTPATIENT
Start: 2023-08-06 | End: 2023-08-06 | Stop reason: HOSPADM

## 2023-08-06 RX ORDER — BUPIVACAINE HYDROCHLORIDE AND EPINEPHRINE 5; 5 MG/ML; UG/ML
INJECTION, SOLUTION PERINEURAL PRN
Status: DISCONTINUED | OUTPATIENT
Start: 2023-08-06 | End: 2023-08-06 | Stop reason: HOSPADM

## 2023-08-06 RX ORDER — GLYCOPYRROLATE 0.2 MG/ML
INJECTION, SOLUTION INTRAMUSCULAR; INTRAVENOUS PRN
Status: DISCONTINUED | OUTPATIENT
Start: 2023-08-06 | End: 2023-08-06

## 2023-08-06 RX ORDER — METOPROLOL TARTRATE 1 MG/ML
INJECTION, SOLUTION INTRAVENOUS PRN
Status: DISCONTINUED | OUTPATIENT
Start: 2023-08-06 | End: 2023-08-06

## 2023-08-06 RX ORDER — IBUPROFEN 600 MG/1
600 TABLET, FILM COATED ORAL EVERY 6 HOURS PRN
Qty: 30 TABLET | Refills: 0 | Status: SHIPPED | OUTPATIENT
Start: 2023-08-06

## 2023-08-06 RX ORDER — ONDANSETRON 2 MG/ML
4 INJECTION INTRAMUSCULAR; INTRAVENOUS EVERY 30 MIN PRN
Status: DISCONTINUED | OUTPATIENT
Start: 2023-08-06 | End: 2023-08-06 | Stop reason: HOSPADM

## 2023-08-06 RX ORDER — DIAZEPAM 10 MG/2ML
2.5 INJECTION, SOLUTION INTRAMUSCULAR; INTRAVENOUS
Status: DISCONTINUED | OUTPATIENT
Start: 2023-08-06 | End: 2023-08-06 | Stop reason: HOSPADM

## 2023-08-06 RX ORDER — ACETAMINOPHEN 325 MG/1
650 TABLET ORAL EVERY 4 HOURS PRN
Qty: 50 TABLET | Refills: 0 | Status: SHIPPED | OUTPATIENT
Start: 2023-08-06

## 2023-08-06 RX ORDER — OXYCODONE HYDROCHLORIDE 5 MG/1
5 TABLET ORAL
Status: DISCONTINUED | OUTPATIENT
Start: 2023-08-06 | End: 2023-08-06 | Stop reason: HOSPADM

## 2023-08-06 RX ORDER — OXYCODONE HYDROCHLORIDE 5 MG/1
10 TABLET ORAL
Status: DISCONTINUED | OUTPATIENT
Start: 2023-08-06 | End: 2023-08-06 | Stop reason: HOSPADM

## 2023-08-06 RX ORDER — OXYCODONE HYDROCHLORIDE 5 MG/1
5-10 TABLET ORAL EVERY 4 HOURS PRN
Qty: 6 TABLET | Refills: 0 | Status: SHIPPED | OUTPATIENT
Start: 2023-08-06

## 2023-08-06 RX ORDER — ONDANSETRON 4 MG/1
4 TABLET, ORALLY DISINTEGRATING ORAL EVERY 30 MIN PRN
Status: DISCONTINUED | OUTPATIENT
Start: 2023-08-06 | End: 2023-08-06 | Stop reason: HOSPADM

## 2023-08-06 RX ORDER — DIMENHYDRINATE 50 MG/ML
25 INJECTION, SOLUTION INTRAMUSCULAR; INTRAVENOUS
Status: DISCONTINUED | OUTPATIENT
Start: 2023-08-06 | End: 2023-08-06 | Stop reason: HOSPADM

## 2023-08-06 RX ORDER — HYDROMORPHONE HCL IN WATER/PF 6 MG/30 ML
0.2 PATIENT CONTROLLED ANALGESIA SYRINGE INTRAVENOUS EVERY 5 MIN PRN
Status: DISCONTINUED | OUTPATIENT
Start: 2023-08-06 | End: 2023-08-06 | Stop reason: HOSPADM

## 2023-08-06 RX ORDER — DIPHENHYDRAMINE HCL 25 MG
25 CAPSULE ORAL EVERY 6 HOURS PRN
Status: DISCONTINUED | OUTPATIENT
Start: 2023-08-06 | End: 2023-08-06 | Stop reason: HOSPADM

## 2023-08-06 RX ORDER — SODIUM CHLORIDE, SODIUM LACTATE, POTASSIUM CHLORIDE, CALCIUM CHLORIDE 600; 310; 30; 20 MG/100ML; MG/100ML; MG/100ML; MG/100ML
INJECTION, SOLUTION INTRAVENOUS CONTINUOUS
Status: DISCONTINUED | OUTPATIENT
Start: 2023-08-06 | End: 2023-08-06 | Stop reason: HOSPADM

## 2023-08-06 RX ORDER — ONDANSETRON 2 MG/ML
INJECTION INTRAMUSCULAR; INTRAVENOUS PRN
Status: DISCONTINUED | OUTPATIENT
Start: 2023-08-06 | End: 2023-08-06

## 2023-08-06 RX ORDER — OXYCODONE HYDROCHLORIDE 5 MG/1
5 TABLET ORAL
Status: COMPLETED | OUTPATIENT
Start: 2023-08-06 | End: 2023-08-06

## 2023-08-06 RX ORDER — ACETAMINOPHEN 325 MG/1
650 TABLET ORAL
Status: DISCONTINUED | OUTPATIENT
Start: 2023-08-06 | End: 2023-08-06 | Stop reason: HOSPADM

## 2023-08-06 RX ORDER — ACETAMINOPHEN 325 MG/1
975 TABLET ORAL ONCE
Status: DISCONTINUED | OUTPATIENT
Start: 2023-08-06 | End: 2023-08-06 | Stop reason: HOSPADM

## 2023-08-06 RX ORDER — LIDOCAINE HYDROCHLORIDE 20 MG/ML
INJECTION, SOLUTION INFILTRATION; PERINEURAL PRN
Status: DISCONTINUED | OUTPATIENT
Start: 2023-08-06 | End: 2023-08-06

## 2023-08-06 RX ORDER — SODIUM CHLORIDE, SODIUM LACTATE, POTASSIUM CHLORIDE, CALCIUM CHLORIDE 600; 310; 30; 20 MG/100ML; MG/100ML; MG/100ML; MG/100ML
INJECTION, SOLUTION INTRAVENOUS CONTINUOUS PRN
Status: DISCONTINUED | OUTPATIENT
Start: 2023-08-06 | End: 2023-08-06

## 2023-08-06 RX ORDER — DIPHENHYDRAMINE HYDROCHLORIDE 50 MG/ML
25 INJECTION INTRAMUSCULAR; INTRAVENOUS EVERY 6 HOURS PRN
Status: DISCONTINUED | OUTPATIENT
Start: 2023-08-06 | End: 2023-08-06 | Stop reason: HOSPADM

## 2023-08-06 RX ORDER — INDOCYANINE GREEN AND WATER 25 MG
2.5 KIT INJECTION ONCE
Status: COMPLETED | OUTPATIENT
Start: 2023-08-06 | End: 2023-08-06

## 2023-08-06 RX ORDER — PROPOFOL 10 MG/ML
INJECTION, EMULSION INTRAVENOUS PRN
Status: DISCONTINUED | OUTPATIENT
Start: 2023-08-06 | End: 2023-08-06

## 2023-08-06 RX ORDER — LABETALOL HYDROCHLORIDE 5 MG/ML
10 INJECTION, SOLUTION INTRAVENOUS
Status: DISCONTINUED | OUTPATIENT
Start: 2023-08-06 | End: 2023-08-06 | Stop reason: HOSPADM

## 2023-08-06 RX ORDER — CEFAZOLIN SODIUM/WATER 2 G/20 ML
2 SYRINGE (ML) INTRAVENOUS
Status: COMPLETED | OUTPATIENT
Start: 2023-08-06 | End: 2023-08-06

## 2023-08-06 RX ORDER — ALBUTEROL SULFATE 0.83 MG/ML
2.5 SOLUTION RESPIRATORY (INHALATION) EVERY 4 HOURS PRN
Status: DISCONTINUED | OUTPATIENT
Start: 2023-08-06 | End: 2023-08-06 | Stop reason: HOSPADM

## 2023-08-06 RX ORDER — AMOXICILLIN 250 MG
1-2 CAPSULE ORAL 2 TIMES DAILY
Qty: 30 TABLET | Refills: 0 | Status: SHIPPED | OUTPATIENT
Start: 2023-08-06

## 2023-08-06 RX ORDER — NEOSTIGMINE METHYLSULFATE 1 MG/ML
VIAL (ML) INJECTION PRN
Status: DISCONTINUED | OUTPATIENT
Start: 2023-08-06 | End: 2023-08-06

## 2023-08-06 RX ORDER — HALOPERIDOL 5 MG/ML
1 INJECTION INTRAMUSCULAR
Status: DISCONTINUED | OUTPATIENT
Start: 2023-08-06 | End: 2023-08-06 | Stop reason: HOSPADM

## 2023-08-06 RX ORDER — KETOROLAC TROMETHAMINE 30 MG/ML
INJECTION, SOLUTION INTRAMUSCULAR; INTRAVENOUS PRN
Status: DISCONTINUED | OUTPATIENT
Start: 2023-08-06 | End: 2023-08-06

## 2023-08-06 RX ADMIN — MIDAZOLAM 2 MG: 1 INJECTION INTRAMUSCULAR; INTRAVENOUS at 12:01

## 2023-08-06 RX ADMIN — OXYCODONE HYDROCHLORIDE 5 MG: 5 TABLET ORAL at 15:00

## 2023-08-06 RX ADMIN — ROCURONIUM BROMIDE 45 MG: 50 INJECTION, SOLUTION INTRAVENOUS at 12:07

## 2023-08-06 RX ADMIN — METOPROLOL TARTRATE 2.5 MG: 5 INJECTION INTRAVENOUS at 12:38

## 2023-08-06 RX ADMIN — GLYCOPYRROLATE 0.2 MG: 0.2 INJECTION, SOLUTION INTRAMUSCULAR; INTRAVENOUS at 12:07

## 2023-08-06 RX ADMIN — NEOSTIGMINE METHYLSULFATE 4 MG: 1 INJECTION, SOLUTION INTRAVENOUS at 13:25

## 2023-08-06 RX ADMIN — INDOCYANINE GREEN AND WATER 2.5 MG: KIT at 10:49

## 2023-08-06 RX ADMIN — BUPROPION HYDROCHLORIDE 300 MG: 150 TABLET, EXTENDED RELEASE ORAL at 08:42

## 2023-08-06 RX ADMIN — FENTANYL CITRATE 150 MCG: 50 INJECTION, SOLUTION INTRAMUSCULAR; INTRAVENOUS at 12:05

## 2023-08-06 RX ADMIN — CITALOPRAM HYDROBROMIDE 10 MG: 10 TABLET ORAL at 08:42

## 2023-08-06 RX ADMIN — ROCURONIUM BROMIDE 5 MG: 50 INJECTION, SOLUTION INTRAVENOUS at 12:05

## 2023-08-06 RX ADMIN — ONDANSETRON 4 MG: 2 INJECTION INTRAMUSCULAR; INTRAVENOUS at 12:07

## 2023-08-06 RX ADMIN — SODIUM CHLORIDE, POTASSIUM CHLORIDE, SODIUM LACTATE AND CALCIUM CHLORIDE: 600; 310; 30; 20 INJECTION, SOLUTION INTRAVENOUS at 11:35

## 2023-08-06 RX ADMIN — DEXAMETHASONE SODIUM PHOSPHATE 8 MG: 4 INJECTION, SOLUTION INTRA-ARTICULAR; INTRALESIONAL; INTRAMUSCULAR; INTRAVENOUS; SOFT TISSUE at 12:07

## 2023-08-06 RX ADMIN — PROPOFOL 200 MG: 10 INJECTION, EMULSION INTRAVENOUS at 12:05

## 2023-08-06 RX ADMIN — LIDOCAINE HYDROCHLORIDE 50 MG: 20 INJECTION, SOLUTION INFILTRATION; PERINEURAL at 12:05

## 2023-08-06 RX ADMIN — KETOROLAC TROMETHAMINE 30 MG: 30 INJECTION, SOLUTION INTRAMUSCULAR at 12:05

## 2023-08-06 RX ADMIN — FENTANYL CITRATE 100 MCG: 50 INJECTION, SOLUTION INTRAMUSCULAR; INTRAVENOUS at 12:07

## 2023-08-06 RX ADMIN — Medication 2 G: at 12:01

## 2023-08-06 RX ADMIN — GLYCOPYRROLATE 0.3 MG: 0.2 INJECTION, SOLUTION INTRAMUSCULAR; INTRAVENOUS at 13:25

## 2023-08-06 ASSESSMENT — ACTIVITIES OF DAILY LIVING (ADL)
ADLS_ACUITY_SCORE: 25

## 2023-08-06 NOTE — ANESTHESIA CARE TRANSFER NOTE
Patient: Adrienne Guzman    Procedure: Procedure(s):  CHOLECYSTECTOMY, LAPAROSCOPIC       Diagnosis: Biliary colic [K80.50]  Diagnosis Additional Information: No value filed.    Anesthesia Type:   General     Note:    Oropharynx: oropharynx clear of all foreign objects  Level of Consciousness: awake  Oxygen Supplementation: face mask  Level of Supplemental Oxygen (L/min / FiO2): 6  Independent Airway: airway patency satisfactory and stable  Dentition: dentition unchanged  Vital Signs Stable: post-procedure vital signs reviewed and stable  Report to RN Given: handoff report given  Patient transferred to: PACU    Handoff Report: Identifed the Patient, Identified the Reponsible Provider, Reviewed the pertinent medical history, Discussed the surgical course, Reviewed Intra-OP anesthesia mangement and issues during anesthesia, Set expectations for post-procedure period and Allowed opportunity for questions and acknowledgement of understanding      Vitals:  Vitals Value Taken Time   /74 08/06/23 1341   Temp 37    Pulse 79 08/06/23 1344   Resp 13 08/06/23 1344   SpO2 97 % 08/06/23 1344   Vitals shown include unvalidated device data.    Electronically Signed By: ZACKARY Velasco CRNA  August 6, 2023  1:45 PM

## 2023-08-06 NOTE — OP NOTE
[facility] General Surgery Operative Note    Pre-operative diagnosis: Symptomatic cholelithiasis   Post-operative diagnosis: chronic cholecystitis   Procedure: laparoscopic cholecystectomy   Surgeon: Zenon Jackson MD   Assistant(s): Trish Mcnamara PA-C  The Physician Assistant was medically necessary for their expertise in prepping, camera management, suctioning, suturing and retraction.   Anesthesia: General and local anesthesia with 0.5% marcaine and epinephrine     Estimated blood loss:  Specimen: 25 cc  gallbladder and contents               INDICATION FOR OPERATION: This is a 27 year old female who presented to ED with abdominal pain. Studies including ultrasound were consistent with acute vs chronic cholecystitis. We discussed laparoscopic cholecystectomy and the patient agreed to proceed after hearing the risks and benefits.    DESCRIPTION OF PROCEDURE:  The patient was taken to the operating room and placed on the table in supine position.  General endotracheal anesthesia was induced and the abdomen was prepped and draped in standard sterile fashion.      A 10-mm port was placed in the supra-umbilical position using the Adela technique.  Following smooth establishment of a pneumoperitoneum, three 5-mm ports were placed under direct laparoscopic visualization, one subxiphoid and two right flank working ports.  The abdomen was briefly surveyed for pathology or injury during port placement; no pathology or injuries were seen.      The patient was then placed in reverse Trendelenburg and right side up.  The gallbladder appeared mildly thickened but otherwise not inflamed. It was mildly distended and there were several large stones in the gallbladder.  The fundus of the gallbladder was grasped and retracted cephalad.  The infundibulum was grasped and retracted laterally.  The peritoneum over the medial and lateral aspects of the triangle of Calot was taken down with the [place] dissector, suction   and modest amounts of Bovie electrocautery.  The cystic duct and artery were freed up from surrounding tissues.  The triangle of Calot was skeletonized revealing the critical view of safety. Intraoperative fluorescence was also used to visualize the course of the common bile duct and cystic duct with no other ductal structures seen.    The cystic artery and duct were each clipped twice proximally, once distally and transected with the scissors.  The clips crossed the cystic duct were close and I decided to additionally secure the duct by placing a 0-PDS Endo-loop around the cystic duct stump. The gallbladder was then removed from the liver using the hook electrocautery.  There was small spillage of bile from the gallbladder during dissection which was suctioned out. The gallbladder was passed into an Endocatch bag and removed from the abdomen. We observed the right upper quadrant carefully for hemostasis.  Hemostasis was assured.  The abdomen was irrigated with saline.     The 5 mm laparoscopic ports were removed under direct visualization and were hemostatic. The Adela and port was then removed and the pneumoperitoneum was evacuated.  The fascia was reapproximated using an 0 Vicryl figure-of-eight stitch placed at the time of fascial incision.  All port sites were then closed with absorbable intracuticular suture.  SteriStrips and sterile bandages were then applied.  The patient was then woken, extubated, and transported to the Postanesthesia Care Unit in satisfactory condition. Sponge and needle counts were correct on two counts at the conclusion of the procedure.     FINDINGS:   1.) the gallbladder was not acutely inflamed but filled with several large stones     Zenon Jackson MD

## 2023-08-06 NOTE — ANESTHESIA PREPROCEDURE EVALUATION
Anesthesia Pre-Procedure Evaluation    Patient: Adrienne Guzman   MRN: [id number] : 1995        Procedure : Procedure(s):  CHOLECYSTECTOMY, LAPAROSCOPIC          Past Medical History:   Diagnosis Date    Anxiety     Asthma     Uses rescue inhaler    Cholelithiasis 2020    Planning to remove gallstones after pregnancy    Depressive disorder     Gastroesophageal reflux disease     Stomach ulcer 2020    R/t H. pylori infection, managing symptoms with antibiotics + diet modification      No past surgical history on file.   No Known Allergies   Social History     Tobacco Use    Smoking status: Never    Smokeless tobacco: Never   Substance Use Topics    Alcohol use: No      Wt Readings from Last 1 Encounters:   23 71.3 kg (157 lb 3.2 oz)        Anesthesia Evaluation   Pt has not had prior anesthetic         ROS/MED HX  ENT/Pulmonary:     (+)                    Intermittent, asthma  Treatment: Inhaler prn,                 Neurologic:  - neg neurologic ROS     Cardiovascular:  - neg cardiovascular ROS     METS/Exercise Tolerance:     Hematologic:  - neg hematologic  ROS     Musculoskeletal:  - neg musculoskeletal ROS     GI/Hepatic: Comment: Hx of PUD    (+) GERD, Asymptomatic on medication,        cholecystitis/cholelithiasis,          Renal/Genitourinary:  - neg Renal ROS     Endo:  - neg endo ROS     Psychiatric/Substance Use:     (+) psychiatric history anxiety and depression       Infectious Disease:  - neg infectious disease ROS     Malignancy:  - neg malignancy ROS     Other:  - neg other ROS          Physical Exam    Airway        Mallampati: II   TM distance: > 3 FB   Neck ROM: full   Mouth opening: > 3 cm    Respiratory Devices and Support         Dental       (+) Completely normal teeth      Cardiovascular   cardiovascular exam normal       Rhythm and rate: regular and normal     Pulmonary   pulmonary exam normal        breath sounds clear to auscultation       Other findings:  Lab Test        08/06/23     08/05/23     10/30/22                       0518          1953          0252          WBC          7.8          6.8          9.3           HGB          12.9         13.6         12.9          MCV          92           93           94            PLT          308          325          336            Lab Test        08/05/23     10/30/22     03/05/21                       1953          0252          2046          NA           138          141          137           POTASSIUM    3.9          3.8          3.6           CHLORIDE     103          105          107           CO2          26 27           23            BUN          7.4          12.0         7             CR           0.65         0.64         0.44*         ANIONGAP     9            9            7             ROB          9.1          9.0          8.9           GLC          91           118*         81                       OUTSIDE LABS:  CBC:   Lab Results   Component Value Date    WBC 7.8 08/06/2023    WBC 6.8 08/05/2023    HGB 12.9 08/06/2023    HGB 13.6 08/05/2023    HCT 39.1 08/06/2023    HCT 41.9 08/05/2023     08/06/2023     08/05/2023     BMP:   Lab Results   Component Value Date     08/05/2023     10/30/2022    POTASSIUM 3.9 08/05/2023    POTASSIUM 3.8 10/30/2022    CHLORIDE 103 08/05/2023    CHLORIDE 105 10/30/2022    CO2 26 08/05/2023    CO2 27 10/30/2022    BUN 7.4 08/05/2023    BUN 12.0 10/30/2022    CR 0.65 08/05/2023    CR 0.64 10/30/2022    GLC 91 08/05/2023     (H) 10/30/2022     COAGS: No results found for: PTT, INR, FIBR  POC:   Lab Results   Component Value Date    HCG Negative 04/15/2018    HCGS Negative 08/05/2023     HEPATIC:   Lab Results   Component Value Date    ALBUMIN 4.1 08/05/2023    PROTTOTAL 7.5 08/05/2023    ALT 78 (H) 08/05/2023    AST 57 (H) 08/05/2023    ALKPHOS 104 08/05/2023    BILITOTAL 0.5 08/05/2023     OTHER:   Lab Results   Component Value Date     ROB 9.1 08/05/2023    LIPASE 19 08/05/2023    CRP 9.6 (H) 03/05/2021       Anesthesia Plan    ASA Status:  2    NPO Status:  NPO Appropriate    Anesthesia Type: General.     - Airway: ETT   Induction: Intravenous.   Maintenance: Balanced.        Consents    Anesthesia Plan(s) and associated risks, benefits, and realistic alternatives discussed. Questions answered and patient/representative(s) expressed understanding.     - Discussed: Risks, Benefits and Alternatives for BOTH SEDATION and the PROCEDURE were discussed     - Discussed with:  Patient      - Extended Intubation/Ventilatory Support Discussed: No.      - Patient is DNR/DNI Status: No     Use of blood products discussed: No .     Postoperative Care    Pain management: Oral pain medications, Multi-modal analgesia, IV analgesics.   PONV prophylaxis: Ondansetron (or other 5HT-3), Dexamethasone or Solumedrol, Background Propofol Infusion     Comments:                Zenon Marcelino MD

## 2023-08-06 NOTE — DISCHARGE INSTRUCTIONS
HOME CARE FOLLOWING LAPAROSCOPIC CHOLECYSTECTOMY  EDISON Pollack, PRASHANT Almodovar C. Pratt, J. Shaheen    INCISIONAL CARE:  Replace the bandage over your incisions DAILY until all drainage stops, or if more comfortable to have in place.  If present, leave the steri-strips (white paper tapes) in place for 14 days after surgery.  If Dermabond (a type of skin glue) is present, leave in place until it wears/flakes off (2-3 weeks).     BATHING:  OK to shower 48 hours after surgery.  Avoid baths for 1 week after surgery.  You may wash your hair at any time.  Gently pat your incision dry after bathing.  Do not apply lotions, creams, or ointments to incisions.    ACTIVITY:  Light Activity -- you may immediately be up and about as tolerated.  Walking is encouraged, increase as tolerated.  Driving/Light Work-- when comfortable and off narcotic pain medications.  Strenuous Work/Activity -- limit lifting to 20 pounds for 2 weeks.  Progressively increase with time.  Active Sports (running, biking, etc.) -- cautiously resume after 2 weeks.    DISCOMFORT:  Local anesthetic placed at surgery should provide relief for 4-8 hours.  Begin taking pain pills before discomfort is severe.  Take the pain medication with some food, when possible, to minimize side effects.  Intermittent use of ice packs may help during the first 1-3 weeks after surgery.  Expect gradual improvement.    Over-the-counter anti-inflammatory medications (i.e. Ibuprofen/Advil/Motrin or Naprosyn/Aleve) may be used per package instructions in addition to or while tapering off the narcotic pain medications to decrease swelling and sensitivity.  DO NOT TAKE these Anti-inflammatory medications if your primary physician has advised against doing so, or if you have acid reflux, ulcer, or bleeding disorder, or take blood-thinner medications.  Call your primary physician or the surgery office if you have medication questions.    After laparoscopic  cholecystectomy, you may have shoulder or upper back discomfort due to the gas used during surgery.  This is temporary and should resolve within 2-3 days.  Frequent short walks may help with this.  You may have decreased energy level for 1-2 weeks after surgery related to your recovery.    DIET:  Start with liquids and gradually increase diet as tolerated.  Drink plenty of fluids.  While taking pain medications, consider use of a stool softener, increase your fiber in your diet, or add a fiber supplement (like Metamucil, Citrucel) to help prevent constipation - a possible side effect of pain medications.  It is not uncommon to experience some bowel changes (loose stools or constipation) after surgery.  Your body has to adapt to you no longer having a gall bladder.  To help minimize this side effect, avoid fatty foods for 1-2 weeks after surgery.  You may then slowly increase the amount of fatty foods in your diet.      NAUSEA:  If nauseated from the anesthetic/pain meds; rest in bed, get up cautiously with assistance, and drink clear liquids (juice, tea, broth).    FOLLOW-UP AFTER SURGERY:  -Our office will contact you approximately 2-3 weeks after surgery to check on your progress and answer any questions you may have.  If you are doing well, you will not need to return for an office appointment.  If any concerns are identified over the phone, we will help you make an appointment to see a provider.    -If you have not received a phone call, have any questions or concerns, or would like to be seen, please call us at [phone].  We are located at: [address], [address]; [place] [address]    -CONTACT US IF THE FOLLOWING DEVELOPS:   1. A fever that is above 101     2. Increased redness, warmth, drainage, bleeding, or swelling.   3. Pain that is not relieved by rest/ice and your prescription.   4.  Increasing pain after 48 hours.   5. Drainage that is thick, cloudy, yellow, green or white.   6. Any other  questions or concerns.      FREQUENTLY ASKED QUESTIONS:    Q:  How should my incision look?    A:  Normally your incision will appear slightly swollen with light redness directly along the incision itself as it heals.  It may feel like a bump or ridge as the healing/scarring happens, and over time (3-4 months) this bump or ridge feeling should slowly go away.  In general, clear or pink watery drainage can be normal at first as your incision heals, but should decrease over time.    Q:  How do I know if my incision is infected?  A:  Look at your incision for signs of infection, like redness around the incision spreading to surrounding skin, or drainage of cloudy or foul-smelling drainage.  If you feel warm, check your temperature to see if you are running a fever.    **If any of these things occur, please notify the nurse at our office.  We may need you to come into the office for an incision check.      Q:  How do I take care of my incision?  A:  If you have a dressing in place - Starting the day after surgery, replace the dressing 1-2 times a day until there is no further drainage from the incision.  At that time, a dressing is no longer needed.  Try to minimize tape on the skin if irritation is occurring at the tape sites.  If you have significant irritation from tape on the skin, please call the office to discuss other method of dressing your incision.    Small pieces of tape called  steri-strips  may be present directly overlying your incision; these may be removed 10 days after surgery unless otherwise specified by your surgeon.  If these tapes start to loosen at the ends, you may trim them back until they fall off or are removed.    A:  If you had  Dermabond  tissue glue used as a dressing (this causes your incision to look shiny with a clear covering over it) - This type of dressing wears off with time and does not require more dressings over the top unless it is draining around the glue as it wears off.  Do  not apply ointments or lotions over the incisions until the glue has completely worn off.    Q:  There is a piece of tape or a sticky  lead  still on my skin.  Can I remove this?  A:  Sometimes the sticky  leads  used for monitoring during surgery or for evaluation in the emergency department are not all removed while you are in the hospital.  These sometimes have a tab or metal dot on them.  You can easily remove these on your own, like taking off a band-aid.  If there is a gel substance under the  lead , simply wipe/clean it off with a washcloth or paper towel.      Q:  What can I do to minimize constipation (very hard stools, or lack of stools)?  A:  Stay well hydrated.  Increase your dietary fiber intake or take a fiber supplement -with plenty of water.  Walk around frequently.  You may consider an over-the-counter stool-softener.  Your Pharmacist can assist you with choosing one that is stocked at your pharmacy.  Constipation is also one of the most common side effects of pain medication.  If you are using pain medication, be pro-active and try to PREVENT problems with constipation by taking the steps above BEFORE constipation becomes a problem.    Q:  What do I do if I need more pain medications?  A:  Call the office to receive refills.  Be aware that certain pain meds cannot be called into a pharmacy and actually require a paper prescription.  A change may be made in your pain med as you progress thru your recovery period or if you have side effects to certain meds.    --Pain meds are NOT refilled after 5pm on weekdays, and NOT AT ALL on the weekends, so please look ahead to prevent problems.      Q:  Why am I having a hard time sleeping now that I am at home?  A:  Many medications you receive while you are in the hospital can impact your sleep for a number of days after your surgery/hospitalization.  Decreased level of activity and naps during the day may also make sleeping at night difficult.  Try to  minimize day-time naps, and get up frequently during the day to walk around your home during your recovery time.  Sleep aides may be of some help, but are not recommended for long-term use.      Q:  I am having some back discomfort.  What should I do?  A:  This may be related to certain positioning that was required for your surgery, extended periods of time in bed, or other changes in your overall activity level.  You may try ice, heat, acetaminophen, or ibuprofen to treat this temporarily.  Note that many pain medications have acetaminophen in them and would state this on the prescription bottle.  Be sure not to exceed the maximum of 4000mg per day of acetaminophen.     **If the pain you are having does not resolve, is severe, or is a flare of back pain you have had on other occasions prior to surgery, please contact your primary physician for further recommendations or for an appointment to be examined at their office.    Q:  Why am I having headaches?  A:  Headaches can be caused by many things:  caffeine withdrawal, use of pain meds, dehydration, high blood pressure, lack of sleep, over-activity/exhaustion, flare-up of usual migraine headaches.  If you feel this is related to muscle tension (a band-like feeling around the head, or a pressure at the low-back of the head) you may try ice or heat to this area.  You may need to drink more fluids (try electrolyte drink like Gatorade), rest, or take your usual migraine medications.   **If your headaches do not resolve, worsen, are accompanied by other symptoms, or if your blood pressure is high, please call your primary physician for recommendation and/or examination.    Q:  I am unable to urinate.  What do I do?  A:  A small percentage of people can have difficulty urinating initially after surgery.  This includes being able to urinate only a very small amount at a time and feeling discomfort or pressure in the very low abdomen.  This is called  urinary retention ,  and is actually an urgent situation.  Proceed to your nearest Emergency department for evaluation (not an Urgent Care Center).  Sometimes the bladder does not work correctly after certain medications you receive during surgery, or related to certain procedures.  You may need to have a catheter placed until your bladder recovers.  When planning to go to an Emergency department, it may help to call the ER to let them know you are coming in for this problem after a surgery.  This may help you get in quicker to be evaluated.  **If you have symptoms of a urinary tract infection, please contact your primary physician for the proper evaluation and treatment.          If you have other questions, please call the office Monday thru Friday between 8am and 4:30pm to discuss with the nurse or physician assistant.  #[phone]    There is a surgeon ON CALL on weekday evenings and over the weekend in case of urgent need only, and may be contacted at the same number.    If you are having an emergency, call 911 or proceed to your nearest emergency department.      GENERAL ANESTHESIA OR SEDATION ADULT DISCHARGE INSTRUCTIONS   SPECIAL PRECAUTIONS FOR 24 HOURS AFTER SURGERY    IT IS NOT UNUSUAL TO FEEL LIGHT-HEADED OR FAINT, UP TO 24 HOURS AFTER SURGERY OR WHILE TAKING PAIN MEDICATION.  IF YOU HAVE THESE SYMPTOMS; SIT FOR A FEW MINUTES BEFORE STANDING AND HAVE SOMEONE ASSIST YOU WHEN YOU GET UP TO WALK OR USE THE BATHROOM.    YOU SHOULD REST AND RELAX FOR THE NEXT 24 HOURS AND YOU MUST MAKE ARRANGEMENTS TO HAVE SOMEONE STAY WITH YOU FOR AT LEAST 24 HOURS AFTER YOUR DISCHARGE.  AVOID HAZARDOUS AND STRENUOUS ACTIVITIES.  DO NOT MAKE IMPORTANT DECISIONS FOR 24 HOURS.    DO NOT DRIVE ANY VEHICLE OR OPERATE MECHANICAL EQUIPMENT FOR 24 HOURS FOLLOWING THE END OF YOUR SURGERY.  EVEN THOUGH YOU MAY FEEL NORMAL, YOUR REACTIONS MAY BE AFFECTED BY THE MEDICATION YOU HAVE RECEIVED.    DO NOT DRINK ALCOHOLIC BEVERAGES FOR 24 HOURS FOLLOWING  YOUR SURGERY.    DRINK CLEAR LIQUIDS (APPLE JUICE, GINGER ALE, 7-UP, BROTH, ETC.).  PROGRESS TO YOUR REGULAR DIET AS YOU FEEL ABLE.    YOU MAY HAVE A DRY MOUTH, A SORE THROAT, MUSCLES ACHES OR TROUBLE SLEEPING.  THESE SHOULD GO AWAY AFTER 24 HOURS.    CALL YOUR DOCTOR FOR ANY OF THE FOLLOWING:  SIGNS OF INFECTION (FEVER, GROWING TENDERNESS AT THE SURGERY SITE, A LARGE AMOUNT OF DRAINAGE OR BLEEDING, SEVERE PAIN, FOUL-SMELLING DRAINAGE, REDNESS OR SWELLING.    IT HAS BEEN OVER 8 TO 10 HOURS SINCE SURGERY AND YOU ARE STILL NOT ABLE TO URINATE (PASS WATER).

## 2023-08-06 NOTE — DISCHARGE SUMMARY
"[facility]  Hospitalist Discharge Summary      Date of Admission:  8/5/2023  Date of Discharge:  8/6/2023  Discharging Provider: Darrius Richard MD  Discharge Service: Hospitalist Service    Discharge Diagnoses     S/p laparoscopic cholecystectomy  Biliary colic  Cholelithiasis  Depression    Clinically Significant Risk Factors     # Overweight: Estimated body mass index is 28.75 kg/m  as calculated from the following:    Height as of this encounter: 1.575 m (5' 2\").    Weight as of this encounter: 71.3 kg (157 lb 3.2 oz).       Follow-ups Needed After Discharge   Follow-up Appointments     Follow-up and recommended labs and tests       Follow up with surgery as directed.              Unresulted Labs Ordered in the Past 30 Days of this Admission       No orders found for last 31 day(s).            Discharge Disposition   Discharged to home  Condition at discharge: Stable    Hospital Course     Patient is a 27-year-old female with history of depression who presented to the emergency department for evaluation of 2 to 3 days of right upper quadrant abdominal pain.  Retrospectively, she reports 7 years of similar intermittent pain.  Last November she was diagnosed with gallstones and Mikaela cystectomy was recommended.  She did not have insurance at that time, however, so did not pursue it.  She presented to the emergency department last night with recurrent and persistent pain.  Emergency department evaluation showed stable vital signs.  Laboratory evaluation was unremarkable.  Right upper quadrant ultrasound showed gallstones without cholecystitis.  Patient was admitted to observation.  Patient has been seen by surgery and is having cholecystectomy today.  She is anticipated to discharge home after surgery.    Consultations This Hospital Stay   SURGERY GENERAL IP CONSULT    Code Status   Full Code    Time Spent on this Encounter   I, Darrius Richard MD, personally saw the patient today and " spent less than or equal to 30 minutes discharging this patient.       Darrius Richard MD  [facility] PREOP/POSTOP  [address]  [facility] [address]  Phone: [phone]  Fax: [phone]  ______________________________________________________________________    Physical Exam   Vital Signs: Temp: 97.8  F (36.6  C) Temp src: Temporal BP: 113/87 Pulse: 75   Resp: 16 SpO2: 99 % O2 Device: None (Room air)    Weight: 157 lbs 3.2 oz  GENERAL:  Comfortable. Cooperative.  PSYCH: pleasant, oriented, No acute distress.  EYES: PERRLA, Normal conjunctiva.  HEART:  Regular rate and rhythm. No JVD. Pulses normal. No edema.  LUNGS:  Clear to auscultation, normal Respiratory effort.  ABDOMEN:  Soft, no hepatosplenomegaly, normal bowel sounds. RUQ tenderness with palpation.   EXTREMETIES: No clubbing, cyanosis or ischemia  SKIN:  Dry to touch, No rash.         Primary Care Physician   Katarina Fitzpatrick    Discharge Orders      Reason for your hospital stay    Biliary colic due to cholelithiasis, s/p laparoscopic cholecystectomy     Follow-up and recommended labs and tests     Follow up with surgery as directed.     Activity    Your activity upon discharge: activity as tolerated and with post op restrictions per surgery     Diet    Follow this diet upon discharge: Advance diet as advised by surgery       Significant Results and Procedures   Most Recent 3 CBC's:  Recent Labs   Lab Test 08/06/23  0518 08/05/23  1952 10/30/22  0252   WBC 7.8 6.8 9.3   HGB 12.9 13.6 12.9   MCV 92 93 94    325 336     Most Recent 3 BMP's:  Recent Labs   Lab Test 08/05/23  1952 10/30/22  0252 03/05/21  2046    141 137   POTASSIUM 3.9 3.8 3.6   CHLORIDE 103 105 107   CO2 26 27 23   BUN 7.4 12.0 7   CR 0.65 0.64 0.44*   ANIONGAP 9 9 7   ROB 9.1 9.0 8.9   GLC 91 118* 81   ,   Results for orders placed or performed during the hospital encounter of 08/05/23   Abdomen US, limited (RUQ only)    Narrative    EXAM: US ABDOMEN  LIMITED  LOCATION: [facility]  DATE: 8/5/2023    INDICATION: ruq pain  COMPARISON: None.  TECHNIQUE: Limited abdominal ultrasound.    FINDINGS:    GALLBLADDER: Gallstones are identified. No pericholecystic free fluid. The gallbladder wall is borderline in size. No sonographic Dumont's sign.    BILE DUCTS: No biliary dilatation. The common duct measures 5 mm.    LIVER: Increased echogenicity from diffuse fatty infiltration. No focal mass. The liver measures 15 cm in maximum dimension.    RIGHT KIDNEY: No hydronephrosis.    PANCREAS: The pancreas is largely obscured by overlying gas.    No ascites.      Impression    IMPRESSION:  1.  Cholelithiasis. The gallbladder wall. The upper limits of normal in size to mildly thickened. No pericholecystic free fluid. No sonographic Dumont's sign. No intra or extrahepatic biliary ductal dilatation.  2.  Diffuse fatty infiltration of the liver.           Discharge Medications   Current Discharge Medication List        CONTINUE these medications which have NOT CHANGED    Details   albuterol (PROAIR HFA/PROVENTIL HFA/VENTOLIN HFA) 108 (90 Base) MCG/ACT inhaler Inhale 2 puffs into the lungs every 6 hours as needed for shortness of breath, wheezing or cough      buPROPion (WELLBUTRIN XL) 300 MG 24 hr tablet Take 300 mg by mouth daily      citalopram (CELEXA) 10 MG tablet Take 10 mg by mouth daily      hydrOXYzine (ATARAX) 50 MG tablet Take  mg by mouth 3 times daily as needed for itching      oxyCODONE (ROXICODONE) 5 MG tablet Take 5 mg by mouth daily as needed for severe pain           Allergies   No Known Allergies

## 2023-08-06 NOTE — ANESTHESIA POSTPROCEDURE EVALUATION
Patient: Adrienne Guzman    Procedure: Procedure(s):  CHOLECYSTECTOMY, LAPAROSCOPIC       Anesthesia Type:  General    Note:  Disposition: Outpatient   Postop Pain Control: Uneventful            Sign Out: Well controlled pain   PONV: No   Neuro/Psych: Uneventful            Sign Out: Acceptable/Baseline neuro status   Airway/Respiratory: Uneventful            Sign Out: Acceptable/Baseline resp. status   CV/Hemodynamics: Uneventful            Sign Out: Acceptable CV status; No obvious hypovolemia; No obvious fluid overload   Other NRE: NONE   DID A NON-ROUTINE EVENT OCCUR? No           Last vitals:  Vitals Value Taken Time   /69 08/06/23 1345   Temp 98  F (36.7  C) 08/06/23 1340   Pulse 82 08/06/23 1350   Resp 9 08/06/23 1350   SpO2 97 % 08/06/23 1350   Vitals shown include unvalidated device data.    Electronically Signed By: Zenon Marcelino MD  August 6, 2023  1:51 PM

## 2023-08-06 NOTE — ANESTHESIA PROCEDURE NOTES
Airway       Patient location during procedure: OR       Procedure Start/Stop Times: 8/6/2023 12:07 PM  Staff -        CRNA: Jonathan Rae APRN CRNA       Performed By: CRNA  Consent for Airway        Urgency: elective  Indications and Patient Condition       Indications for airway management: chema-procedural       Induction type:intravenous       Mask difficulty assessment: 1 - vent by mask    Final Airway Details       Final airway type: endotracheal airway       Successful airway: ETT - single  Endotracheal Airway Details        ETT size (mm): 7.0       Cuffed: yes       Successful intubation technique: direct laryngoscopy       DL Blade Type: Gonzalez 2       Grade View of Cords: 1       Adjucts: stylet       Position: Right       Measured from: lips       Secured at (cm): 22    Post intubation assessment        Placement verified by: capnometry, equal breath sounds and chest rise        Number of attempts at approach: 1       Number of other approaches attempted: 0       Secured with: plastic tape       Ease of procedure: easy       Dentition: Unchanged and Intact    Medication(s) Administered   Medication Administration Time: 8/6/2023 12:07 PM

## 2023-08-06 NOTE — H&P
History and Physical     Adrienne Guzman MRN# [id number]   YOB: 1995 Age: 27 year old      Date of Admission:  8/5/2023    Primary care provider: Katarina Fitzpatrick          Assessment and Plan:     Summary of Stay: Adrienne Guzman is a 27 year old female without significant PMH admitted on 8/5/2023 with persistent biliary symptoms    She's had RLQ pain periodically for years.  States every few months she'll get ruq discomfort with some associated nausea.  These episodes will typically resolve over a few hours.  Apparently last November had a more severe event and at some point had seen a surgeon who had recommended cholecystectomy but she didn't have insurance and so was never able to get it set up.  She has some low dose oxy at home that she will periodically use if the pain gets too intense. This episode began 3 days ago after eating some cereal.  She has constant pain and nausea that worsen with any eating so has not been able to take in hardly anything by mouth.  She denies any fevers.  She's noticed some mild diarrhea.     ER with stable VS  Labs CMP wnl x ast/alt 57/78 normal bili  CBC wnl  UA with large blood and otherwise negative    US cholelithiasis with mild GBW thickening but no signs of cholecystitis      Problem List:   Biliary colic  Admit, NPO, surgical consultation     Depression   Await med rec    DVT Prophylaxis: Ambulate every shift  Code Status: Full Code  Functional Status: independent  Lainez: not needed  Access:   PIV              Time spent 40 minutes reviewing epic including notes/labs/prior hx, current medications.  In addition to interviewing and examining the patient, updated patient and family regarding plan of care          Chief Complaint:     Biliary colic        History of Present Illness:   Adrienne Guzman is a 27 year old female without significant PMH admitted on 8/5/2023 with persistent biliary symptoms    She's had RLQ pain  periodically for years.  States every few months she'll get ruq discomfort with some associated nausea.  These episodes will typically resolve over a few hours.  Apparently last November had a more severe event and at some point had seen a surgeon who had recommended cholecystectomy but she didn't have insurance and so was never able to get it set up.  She has some low dose oxy at home that she will periodically use if the pain gets too intense. This episode began 3 days ago after eating some cereal.  She has constant pain and nausea that worsen with any eating so has not been able to take in hardly anything by mouth.  She denies any fevers.  She's noticed some mild diarrhea.     ER with stable VS  Labs CMP wnl x ast/alt 57/78 normal bili  CBC wnl  UA with large blood and otherwise negative    US cholelithiasis with mild GBW thickening but no signs of cholecystitis      The history is obtained in discussion with the ER provider Dr Mccarthy and the patient with excellent reliability     Epic and Care everywhere were extensively reviewed        Past Medical History:     Past Medical History:   Diagnosis Date    Anxiety     Asthma     Uses rescue inhaler    Cholelithiasis 12/2020    Planning to remove gallstones after pregnancy    Depressive disorder     Gastroesophageal reflux disease     Stomach ulcer 12/2020    R/t H. pylori infection, managing symptoms with antibiotics + diet modification             Past Surgical History:   No past surgical history on file.          Social History:     Social History     Tobacco Use    Smoking status: Never    Smokeless tobacco: Never   Substance Use Topics    Alcohol use: No             Family History:   Reviewed and non contributory          Allergies:   No Known Allergies          Medications:     Prior to Admission medications    Medication Sig Last Dose Taking? Auth Provider Long Term End Date   albuterol (PROAIR HFA/PROVENTIL HFA/VENTOLIN HFA) 108 (90 Base) MCG/ACT inhaler  "Inhale 2 puffs into the lungs every 6 hours as needed for shortness of breath, wheezing or cough More than a month at PRN Yes Unknown, Entered By History Yes    buPROPion (WELLBUTRIN XL) 300 MG 24 hr tablet Take 300 mg by mouth daily 8/3/2023 Yes Unknown, Entered By History Yes    citalopram (CELEXA) 10 MG tablet Take 10 mg by mouth daily 8/3/2023 Yes Unknown, Entered By History Yes    hydrOXYzine (ATARAX) 50 MG tablet Take  mg by mouth 3 times daily as needed for itching 8/3/2023 Yes Unknown, Entered By History     oxyCODONE (ROXICODONE) 5 MG tablet Take 5 mg by mouth daily as needed for severe pain 8/4/2023 Yes Unknown, Entered By History                 Review of Systems:     A Comprehensive greater than 10 system review of systems was carried out.  Pertinent positives and negatives are noted above.  Otherwise negative for contributory information.           Physical Exam:   Blood pressure 124/85, pulse 77, temperature 97.9  F (36.6  C), temperature source Oral, resp. rate 18, height 1.6 m (5' 3\"), weight 73.9 kg (163 lb), SpO2 100 %, not currently breastfeeding.  Exam:    General:  Pleasant nad looks stated age  HEENT:  Head nc/at sclera clear PERRL O/P:  Moist mucus membranes no posterior pharyngeal erythema or exudate.  Neck is supple  Lungs: cta b nl effort   CV:  RRR no m/r/g no le edema  Abd:  positive RUQ   Neuro:  Cn 2-12 grossly intact and blanchard  Alert and oriented affect appropriate   Skin:  W/d no c/c               Data:     Results for orders placed or performed during the hospital encounter of 08/05/23   Abdomen US, limited (RUQ only)     Status: None    Narrative    EXAM: US ABDOMEN LIMITED  LOCATION: [facility]  DATE: 8/5/2023    INDICATION: ruq pain  COMPARISON: None.  TECHNIQUE: Limited abdominal ultrasound.    FINDINGS:    GALLBLADDER: Gallstones are identified. No pericholecystic free fluid. The gallbladder wall is borderline in size. No sonographic Dumont's " sign.    BILE DUCTS: No biliary dilatation. The common duct measures 5 mm.    LIVER: Increased echogenicity from diffuse fatty infiltration. No focal mass. The liver measures 15 cm in maximum dimension.    RIGHT KIDNEY: No hydronephrosis.    PANCREAS: The pancreas is largely obscured by overlying gas.    No ascites.      Impression    IMPRESSION:  1.  Cholelithiasis. The gallbladder wall. The upper limits of normal in size to mildly thickened. No pericholecystic free fluid. No sonographic Dumont's sign. No intra or extrahepatic biliary ductal dilatation.  2.  Diffuse fatty infiltration of the liver.       Extra Tube (Dewitt Draw)     Status: None    Narrative    The following orders were created for panel order Extra Tube (Dewitt Draw).  Procedure                               Abnormality         Status                     ---------                               -----------         ------                     Extra Blue Top Tube[[id number]]                              Final result               Extra Red Top Tube[[id number]]                               Final result                 Please view results for these tests on the individual orders.   Basic metabolic panel (BMP)     Status: Normal   Result Value Ref Range    Sodium 138 136 - 145 mmol/L    Potassium 3.9 3.4 - 5.3 mmol/L    Chloride 103 98 - 107 mmol/L    Carbon Dioxide (CO2) 26 22 - 29 mmol/L    Anion Gap 9 7 - 15 mmol/L    Urea Nitrogen 7.4 6.0 - 20.0 mg/dL    Creatinine 0.65 0.51 - 0.95 mg/dL    Calcium 9.1 8.6 - 10.0 mg/dL    Glucose 91 70 - 99 mg/dL    GFR Estimate >90 >60 mL/min/1.73m2   Hepatic panel     Status: Abnormal   Result Value Ref Range    Protein Total 7.5 6.4 - 8.3 g/dL    Albumin 4.1 3.5 - 5.2 g/dL    Bilirubin Total 0.5 <=1.2 mg/dL    Alkaline Phosphatase 104 35 - 104 U/L    AST 57 (H) 0 - 45 U/L    ALT 78 (H) 0 - 50 U/L    Bilirubin Direct <0.20 0.00 - 0.30 mg/dL   Lipase     Status: Normal   Result Value Ref Range    Lipase 19 13 - 60  U/L   HCG QUALitative pregnancy (blood)     Status: Normal   Result Value Ref Range    hCG Serum Qualitative Negative Negative   UA with Microscopic     Status: Abnormal   Result Value Ref Range    Color Urine Light Yellow Colorless, Straw, Light Yellow, Yellow    Appearance Urine Clear Clear    Glucose Urine Negative Negative mg/dL    Bilirubin Urine Negative Negative    Ketones Urine Negative Negative mg/dL    Specific Gravity Urine 1.010 1.003 - 1.035    Blood Urine Large (A) Negative    pH Urine 7.5 (H) 5.0 - 7.0    Protein Albumin Urine Negative Negative mg/dL    Urobilinogen Urine Normal Normal, 2.0 mg/dL    Nitrite Urine Negative Negative    Leukocyte Esterase Urine Negative Negative    Bacteria Urine Few (A) None Seen /HPF    RBC Urine >100 (H) <=2 /HPF    WBC Urine 5 <=5 /HPF   Extra Blue Top Tube     Status: None   Result Value Ref Range    Hold Specimen JIC    Extra Red Top Tube     Status: None   Result Value Ref Range    Hold Specimen JIC    CBC with platelets and differential     Status: None   Result Value Ref Range    WBC Count 6.8 4.0 - 11.0 10e3/uL    RBC Count 4.52 3.80 - 5.20 10e6/uL    Hemoglobin 13.6 11.7 - 15.7 g/dL    Hematocrit 41.9 35.0 - 47.0 %    MCV 93 78 - 100 fL    MCH 30.1 26.5 - 33.0 pg    MCHC 32.5 31.5 - 36.5 g/dL    RDW 14.0 10.0 - 15.0 %    Platelet Count 325 150 - 450 10e3/uL    % Neutrophils 56 %    % Lymphocytes 33 %    % Monocytes 10 %    % Eosinophils 1 %    % Basophils 0 %    % Immature Granulocytes 0 %    NRBCs per 100 WBC 0 <1 /100    Absolute Neutrophils 3.8 1.6 - 8.3 10e3/uL    Absolute Lymphocytes 2.3 0.8 - 5.3 10e3/uL    Absolute Monocytes 0.7 0.0 - 1.3 10e3/uL    Absolute Eosinophils 0.1 0.0 - 0.7 10e3/uL    Absolute Basophils 0.0 0.0 - 0.2 10e3/uL    Absolute Immature Granulocytes 0.0 <=0.4 10e3/uL    Absolute NRBCs 0.0 10e3/uL   CBC + differential     Status: None    Narrative    The following orders were created for panel order CBC + differential.  Procedure                                Abnormality         Status                     ---------                               -----------         ------                     CBC with platelets and d...[[id number]]                      Final result                 Please view results for these tests on the individual orders.

## 2023-08-06 NOTE — PHARMACY-ADMISSION MEDICATION HISTORY
Pharmacist Admission Medication History    Admission medication history is complete. The information provided in this note is only as accurate as the sources available at the time of the update.    Medication reconciliation/reorder completed by provider prior to medication history? No    Information Source(s): Patient via in-person    Pertinent Information: none    Changes made to PTA medication list:  Added: Oxycodone 5mg, Bupropion XL 300mg, Citalopram 10mg, Hydroxyzine 50mg, Ventolin inhaler  Deleted: None  Changed: None    Medication Affordability:  Not including over the counter (OTC) medications, was there a time in the past 3 months when you did not take your medications as prescribed because of cost?: No    Allergies reviewed with patient and updates made in EHR: yes    Medication History Completed By: Lalit Carter RPH 8/5/2023 9:49 PM    Prior to Admission medications    Medication Sig Last Dose Taking? Auth Provider Long Term End Date   albuterol (PROAIR HFA/PROVENTIL HFA/VENTOLIN HFA) 108 (90 Base) MCG/ACT inhaler Inhale 2 puffs into the lungs every 6 hours as needed for shortness of breath, wheezing or cough More than a month at PRN Yes Unknown, Entered By History Yes    buPROPion (WELLBUTRIN XL) 300 MG 24 hr tablet Take 300 mg by mouth daily 8/3/2023 Yes Unknown, Entered By History Yes    citalopram (CELEXA) 10 MG tablet Take 10 mg by mouth daily 8/3/2023 Yes Unknown, Entered By History Yes    hydrOXYzine (ATARAX) 50 MG tablet Take  mg by mouth 3 times daily as needed for itching 8/3/2023 Yes Unknown, Entered By History     oxyCODONE (ROXICODONE) 5 MG tablet Take 5 mg by mouth daily as needed for severe pain 8/4/2023 Yes Unknown, Entered By History

## 2023-08-06 NOTE — BRIEF OP NOTE
[facility]    Brief Operative Note    Pre-operative diagnosis: Biliary colic [K80.50]  Post-operative diagnosis Same as pre-operative diagnosis    Procedure: Procedure(s):  CHOLECYSTECTOMY, LAPAROSCOPIC  Surgeon: Surgeon(s) and Role:     * Zenon Jackson MD - Primary     * Trish Mcnamara PA-C - Assisting  Anesthesia: General   Estimated Blood Loss: 25 ml    Drains: None  Specimens:   ID Type Source Tests Collected by Time Destination   1 : Gallbladder and contents Tissue Gallbladder SURGICAL PATHOLOGY EXAM Zenon Jackson MD 8/6/2023  1:21 PM      Findings:   Non-inflamed but dilated gallbladder filled with several large stones .  Complications: None.  Implants: * No implants in log *

## 2023-08-06 NOTE — PLAN OF CARE
ICU End of Shift Summary.  For vital signs and complete assessments, please see documentation flowsheets.        Pertinent assessments:   Neuro: A/O x4, afebrile, denies pain  Cardiac: HR and BP WDL  Resp: LS clear bilaterally  GI: BS active, no complaints with palpation all quads  : voiding spontaneously  Skin: Top of right foot/ankle bruised d/t fall at home, prior to admission.   Lines: none  Drips: none    Major Shift Events:   Admitted under observation status. Pt awaiting consult with general surgery regarding gallbladder.    Plan (Upcoming Events): Con't observation status, monitor VS, labs as ordered per MD.   Discharge/Transfer Needs: TBD     Bedside Shift Report Completed : yes  Bedside Safety Check Completed: yes

## 2023-08-06 NOTE — ED TRIAGE NOTES
Patient presents to the ED with RUQ abdominal pain since Thursday. History of gallbladder problems, states this feels similar. Unable to eat without pain.

## 2023-08-06 NOTE — CONSULTS
General Surgery Consultation    Adrienne Guzman MRN# [id number]   Age: 27 year old YOB: 1995     Date of Admission:  8/5/2023    Reason for consult:            Abdominal pain, right upper quadrant       Requesting physician:            Dr Darrius Richard                 Assessment and Plan:   Assessment:   Adrienne Guzman is a 27 year old female with likely acute cholecystitis.     Comorbidities:   has a past medical history of Anxiety, Asthma, Cholelithiasis (12/2020), Depressive disorder, Gastroesophageal reflux disease, and Stomach ulcer (12/2020).      Plan:   I have offered the patient a laparoscopic cholecystectomy, today.    We have discussed the indication, alternatives, risks and expected recovery.  Specifically we have discussed incisions, scarring, postoperative infections, anesthesia, bleeding, blood transfusion, open conversion, common bile duct injury, injury to intra-abdominal organs, adhesions leading to bowel obstruction, retained common bile duct stone, bile leak, DVT, PE, hernia, post cholecystectomy diarrhea, recovery, postoperative dietary restrictions and physical limitations.  We have discussed the recommended interventions and treatments for these complications.  All questions have been answered to the best of my ability.    She elects to proceed with surgery.   Will plan to discharge to home after surgery today unless complications arise.                 Chief Complaint:   Abdominal pain, right upper quadrant     History is obtained from the patient.         History of Present Illness:   Adrienne Guzman is a 27 year old female with a history of asthma who presented to the ED yesterday with acute onset abdominal pain. The pain is located in the right upper quadrant and has been present since Thursday. She cannot eat without having pain. Today, the pain is still there but more waxing and waning in nature. She has had this pain before and  has a known history of gallstones but could not get her gallbladder out due to insurance issues. She denies fevers. She had a lower abdominal surgery to remove an IUD but otherwise no other surgeries.           Past Medical History:     Past Medical History:   Diagnosis Date    Anxiety     Asthma     Uses rescue inhaler    Cholelithiasis 12/2020    Planning to remove gallstones after pregnancy    Depressive disorder     Gastroesophageal reflux disease     Stomach ulcer 12/2020    R/t H. pylori infection, managing symptoms with antibiotics + diet modification             Past Surgical History:   Lower abdominal surgery to remove displaced IUD           Social History:     Social History     Tobacco Use    Smoking status: Never    Smokeless tobacco: Never   Substance Use Topics    Alcohol use: No             Family History:   History reviewed. No pertinent family history.         Allergies:   No Known Allergies          Medications:   No current facility-administered medications on file prior to encounter.  albuterol (PROAIR HFA/PROVENTIL HFA/VENTOLIN HFA) 108 (90 Base) MCG/ACT inhaler, Inhale 2 puffs into the lungs every 6 hours as needed for shortness of breath, wheezing or cough  buPROPion (WELLBUTRIN XL) 300 MG 24 hr tablet, Take 300 mg by mouth daily  citalopram (CELEXA) 10 MG tablet, Take 10 mg by mouth daily  hydrOXYzine (ATARAX) 50 MG tablet, Take  mg by mouth 3 times daily as needed for itching  oxyCODONE (ROXICODONE) 5 MG tablet, Take 5 mg by mouth daily as needed for severe pain       [Auto Hold] sodium chloride 0.9%  1,000 mL Intravenous Once    [Auto Hold] buPROPion  300 mg Oral Daily    ceFAZolin  2 g Intravenous Pre-Op/Pre-procedure x 1 dose    ceFAZolin  2 g Intravenous See Admin Instructions    [Auto Hold] citalopram  10 mg Oral Daily    [Auto Hold] docusate sodium  100 mg Oral BID    sodium chloride (PF)  3 mL Intracatheter Q8H            Review of Systems:   The 10 point review of systems is  "negative other than noted in the HPI.          Physical Exam:   /87   Pulse 75   Temp 97.8  F (36.6  C) (Temporal)   Resp 16   Ht 1.575 m (5' 2\")   Wt 71.3 kg (157 lb 3.2 oz)   LMP 08/06/2023 (Exact Date)   SpO2 99%   Breastfeeding No   BMI 28.75 kg/m    General - Well developed, well nourished female in no apparent distress  Lungs: normal effort on RA   Heart: regular rate and rhythm, no murmurs  Abdomen: soft, not distended, tender in the RUQ but otherwise not tender throughout   Extremities: Warm without edema  Neurologic: nonfocal  Psychiatric: Mood and affect appropriate  Skin: Without lesions, rashes, or jaundice         Data:     WBC -   Lab Results   Component Value Date    WBC 7.8 08/06/2023       HgB -   Lab Results   Component Value Date    HGB 12.9 08/06/2023       Plt-   Lab Results   Component Value Date     08/06/2023       Liver Function Studies -   Recent Labs   Lab Test 08/05/23 1952   PROTTOTAL 7.5   ALBUMIN 4.1   BILITOTAL 0.5   ALKPHOS 104   AST 57*   ALT 78*       Lipase-   Lab Results   Component Value Date    LIPASE 19 08/05/2023         Imaging:  All imaging studies reviewed by me.    Results for orders placed or performed during the hospital encounter of 08/05/23   Abdomen US, limited (RUQ only)    Narrative    EXAM: US ABDOMEN LIMITED  LOCATION: [facility]  DATE: 8/5/2023    INDICATION: ruq pain  COMPARISON: None.  TECHNIQUE: Limited abdominal ultrasound.    FINDINGS:    GALLBLADDER: Gallstones are identified. No pericholecystic free fluid. The gallbladder wall is borderline in size. No sonographic Dumont's sign.    BILE DUCTS: No biliary dilatation. The common duct measures 5 mm.    LIVER: Increased echogenicity from diffuse fatty infiltration. No focal mass. The liver measures 15 cm in maximum dimension.    RIGHT KIDNEY: No hydronephrosis.    PANCREAS: The pancreas is largely obscured by overlying gas.    No ascites.      Impression    " IMPRESSION:  1.  Cholelithiasis. The gallbladder wall. The upper limits of normal in size to mildly thickened. No pericholecystic free fluid. No sonographic Dumont's sign. No intra or extrahepatic biliary ductal dilatation.  2.  Diffuse fatty infiltration of the liver.             Time spent with the patient, reviewing the EMR, reviewing laboratory and imaging studies, more than 50% of which was counseling and coordinating care:  45 minutes.     Zenon Jackson MD

## 2023-08-06 NOTE — ED PROVIDER NOTES
"  History     Chief Complaint:  Abdominal Pain       The history is provided by the patient.      Adrienne Guzman is a 27 year old female  with a history of cholelithiasis who presents with her mother for RUQ abdominal pain. Patient has a history of gallbladder problems that started seven years ago during her second pregnancy. She was advised to undergo a cholecystectomy, though deferred due to the cost. Patient has not had a pain flare since November, though says that starting two days ago, she has been experiencing intermittent episodes of pain that do not fully subside. She has been taking previously prescribed Oxycodone at home for pain. Patient endorses associated nausea and diaphoresis during the flares. She denies vomiting, bloody stool, measured fever, or urinary symtoms. Patient denies chance of pregnancy and states that she is currently on her menstrual cycle.      Independent Historian:   Mother at bedside endorses the history as stated above.     Review of External Notes:      Reviewed general surgery visit 11/22- symptomatic cholelithiasis- offered cholecystectomy    Medications:    Celexa   Vistaril   Sertraline     Past Medical History:    Anxiety   Asthma   Cholelithiasis  Depressive disorder  Gastroesophageal reflux disease  Stomach ulcer   Pregnancy in multigravida   Iron deficiency anemia     Physical Exam   Patient Vitals for the past 24 hrs:   BP Temp Pulse Resp SpO2 Height Weight   08/05/23 2034 -- -- -- -- -- 1.6 m (5' 3\") 73.9 kg (163 lb)   08/05/23 1952 120/83 -- 85 16 100 % -- --   08/05/23 1913 128/89 97.8  F (36.6  C) 90 16 100 % -- --        Physical Exam  Gen: alert  HEENT: no acute abnl  Neck: normal ROM  CV: RRR, no murmurs  Pulm: breath sounds equal, lungs clear  Abd: Soft, focal RUQ tenderness  Back: no evidence of injury, no cva tenderness  MSK: no deformity, moves all extremities  Skin: no rash  Neuro: alert, appropriate conversation and interaction       Emergency " Department Course     Imaging:  Abdomen US, limited (RUQ only)   Final Result   IMPRESSION:   1.  Cholelithiasis. The gallbladder wall. The upper limits of normal in size to mildly thickened. No pericholecystic free fluid. No sonographic Dumont's sign. No intra or extrahepatic biliary ductal dilatation.   2.  Diffuse fatty infiltration of the liver.               Report per radiology    Laboratory:  Labs Ordered and Resulted from Time of ED Arrival to Time of ED Departure   HEPATIC FUNCTION PANEL - Abnormal       Result Value    Protein Total 7.5      Albumin 4.1      Bilirubin Total 0.5      Alkaline Phosphatase 104      AST 57 (*)     ALT 78 (*)     Bilirubin Direct <0.20     ROUTINE UA WITH MICROSCOPIC - Abnormal    Color Urine Light Yellow      Appearance Urine Clear      Glucose Urine Negative      Bilirubin Urine Negative      Ketones Urine Negative      Specific Gravity Urine 1.010      Blood Urine Large (*)     pH Urine 7.5 (*)     Protein Albumin Urine Negative      Urobilinogen Urine Normal      Nitrite Urine Negative      Leukocyte Esterase Urine Negative      Bacteria Urine Few (*)     RBC Urine >100 (*)     WBC Urine 5     BASIC METABOLIC PANEL - Normal    Sodium 138      Potassium 3.9      Chloride 103      Carbon Dioxide (CO2) 26      Anion Gap 9      Urea Nitrogen 7.4      Creatinine 0.65      Calcium 9.1      Glucose 91      GFR Estimate >90     LIPASE - Normal    Lipase 19     HCG QUALITATIVE PREGNANCY - Normal    hCG Serum Qualitative Negative     CBC WITH PLATELETS AND DIFFERENTIAL    WBC Count 6.8      RBC Count 4.52      Hemoglobin 13.6      Hematocrit 41.9      MCV 93      MCH 30.1      MCHC 32.5      RDW 14.0      Platelet Count 325      % Neutrophils 56      % Lymphocytes 33      % Monocytes 10      % Eosinophils 1      % Basophils 0      % Immature Granulocytes 0      NRBCs per 100 WBC 0      Absolute Neutrophils 3.8      Absolute Lymphocytes 2.3      Absolute Monocytes 0.7      Absolute  Eosinophils 0.1      Absolute Basophils 0.0      Absolute Immature Granulocytes 0.0      Absolute NRBCs 0.0          Emergency Department Course & Assessments:     Interventions:  Medications   ketorolac (TORADOL) injection 15 mg (has no administration in time range)   ondansetron (ZOFRAN) injection 4 mg (has no administration in time range)   0.9% sodium chloride BOLUS (has no administration in time range)   HYDROmorphone (PF) (DILAUDID) injection 0.5 mg (has no administration in time range)        Assessments:  1943 I obtained history and examined the patient as noted above.   2051 I rechecked and updated the patient. Patient's pain is un-changed and she would like to be admitted.    Consultations/Discussion of Management or Tests:  2121 I consulted with hospitalist Dr. Maddox. Patient will be admitted.       Social Determinants of Health affecting care:   Patient did not undergo cholecystectomy due to finances.     Disposition:  The patient was admitted to the hospital under the care of Dr. Maddox.     Impression & Plan      Medical Decision Making:  Patient presents for right upper quadrant pain.  She has known history of gallstones.  Evaluation today shows intractable biliary colic.  No strong evidence to suggest cholecystitis.  LFTs and lipase within normal limits.  No pancreatitis.  Due to intractable pain, patient require admission.  Discussed with Dr. Maddox admitted to the observation unit.    Diagnosis:    ICD-10-CM    1. Biliary colic  K80.50              Scribe Disclosure:  I, ROSEMARIE BRIANNA, am serving as a scribe at 8:12 PM on 8/5/2023 to document services personally performed by Margret Mccarthy MD based on my observations and the provider's statements to me.     8/5/2023   Margret Mccarthy MD Trussell, Kristi Jo Schneider, MD  08/05/23 [id number]

## 2023-08-06 NOTE — ED NOTES
"[facility]  ED Nurse Handoff Report    ED Chief complaint: Abdominal Pain  . ED Diagnosis:   Final diagnoses:   Biliary colic       Allergies: No Known Allergies    Code Status: Full Code    Activity level - Baseline/Home:  independent.  Activity Level - Current:   independent.   Lift room needed: No.   Bariatric: No   Needed: No   Isolation: No.   Infection: Not Applicable.     Respiratory status: Room air    Vital Signs (within 30 minutes):   Vitals:    08/05/23 1913 08/05/23 1952 08/05/23 2034 08/05/23 2130   BP: 128/89 120/83  115/80   Pulse: 90 85  84   Resp: 16 16  18   Temp: 97.8  F (36.6  C)      SpO2: 100% 100%  100%   Weight:   73.9 kg (163 lb)    Height:   1.6 m (5' 3\")        Cardiac Rhythm:  ,      Pain level:    Patient confused: No.   Patient Falls Risk: nonskid shoes/slippers when out of bed, patient and family education, and assistive device/personal items within reach.   Elimination Status: Has voided     Patient Report - Initial Complaint: Abdominal Pain.   Focused Assessment:  Adrienne Guzman is a 27 year old female  with a history of cholelithiasis who presents with her mother for RUQ abdominal pain. Patient has a history of gallbladder problems that started seven years ago during her second pregnancy. She was advised to undergo a cholecystectomy, though deferred due to the cost. Patient has not had a pain flare since November, though says that starting two days ago, she has been experiencing intermittent episodes of pain that do not fully subside. She has been taking previously prescribed Oxycodone at home for pain. Patient endorses associated nausea and diaphoresis during the flares. She denies vomiting, bloody stool, measured fever, or urinary symtoms. Patient denies chance of pregnancy and states that she is currently on her menstrual cycle.  GastrointestinalGastrointestinal WDL: .WDL except; allGI Signs/Symptoms: abdominal discomfort; nausea; " vomitingBowel Sounds: All QuadrantsAll Quadrants Bowel Sounds: normoactiveAdditional Documentation: Bowel Sounds (Row) TJ          1945  Genitourinary  Genitourinary (Adult)Genitourinary WDL: WDL TJ       1945  Skin  Skin WDLSkin WDL: WDL            Abnormal Results:   Labs Ordered and Resulted from Time of ED Arrival to Time of ED Departure   HEPATIC FUNCTION PANEL - Abnormal       Result Value    Protein Total 7.5      Albumin 4.1      Bilirubin Total 0.5      Alkaline Phosphatase 104      AST 57 (*)     ALT 78 (*)     Bilirubin Direct <0.20     ROUTINE UA WITH MICROSCOPIC - Abnormal    Color Urine Light Yellow      Appearance Urine Clear      Glucose Urine Negative      Bilirubin Urine Negative      Ketones Urine Negative      Specific Gravity Urine 1.010      Blood Urine Large (*)     pH Urine 7.5 (*)     Protein Albumin Urine Negative      Urobilinogen Urine Normal      Nitrite Urine Negative      Leukocyte Esterase Urine Negative      Bacteria Urine Few (*)     RBC Urine >100 (*)     WBC Urine 5     BASIC METABOLIC PANEL - Normal    Sodium 138      Potassium 3.9      Chloride 103      Carbon Dioxide (CO2) 26      Anion Gap 9      Urea Nitrogen 7.4      Creatinine 0.65      Calcium 9.1      Glucose 91      GFR Estimate >90     LIPASE - Normal    Lipase 19     HCG QUALITATIVE PREGNANCY - Normal    hCG Serum Qualitative Negative     CBC WITH PLATELETS AND DIFFERENTIAL    WBC Count 6.8      RBC Count 4.52      Hemoglobin 13.6      Hematocrit 41.9      MCV 93      MCH 30.1      MCHC 32.5      RDW 14.0      Platelet Count 325      % Neutrophils 56      % Lymphocytes 33      % Monocytes 10      % Eosinophils 1      % Basophils 0      % Immature Granulocytes 0      NRBCs per 100 WBC 0      Absolute Neutrophils 3.8      Absolute Lymphocytes 2.3      Absolute Monocytes 0.7      Absolute Eosinophils 0.1      Absolute Basophils 0.0      Absolute Immature Granulocytes 0.0      Absolute NRBCs 0.0          Abdomen US,  limited (RUQ only)   Final Result   IMPRESSION:   1.  Cholelithiasis. The gallbladder wall. The upper limits of normal in size to mildly thickened. No pericholecystic free fluid. No sonographic Dumont's sign. No intra or extrahepatic biliary ductal dilatation.   2.  Diffuse fatty infiltration of the liver.                Treatments provided: Blood work, vitals, US  Family Comments: Mother is at bedside and pt wants mother to stay with her the entire time   OBS brochure/video discussed/provided to patient:  Yes  ED Medications:   Medications   ketorolac (TORADOL) injection 15 mg (0 mg Intravenous Hold 8/5/23 2136)   ondansetron (ZOFRAN) injection 4 mg (0 mg Intravenous Hold 8/5/23 2138)   0.9% sodium chloride BOLUS (0 mLs Intravenous Hold 8/5/23 2138)   HYDROmorphone (PF) (DILAUDID) injection 0.5 mg (0 mg Intravenous Hold 8/5/23 2138)       Drips infusing:  No  For the majority of the shift this patient was Green.   Interventions performed were N/A.    Sepsis treatment initiated: No    Cares/treatment/interventions/medications to be completed following ED care: TBD    ED Nurse Name: Benita Lawson RN  9:39 PM

## 2023-08-08 LAB
PATH REPORT.COMMENTS IMP SPEC: NORMAL
PATH REPORT.COMMENTS IMP SPEC: NORMAL
PATH REPORT.FINAL DX SPEC: NORMAL
PATH REPORT.GROSS SPEC: NORMAL
PATH REPORT.MICROSCOPIC SPEC OTHER STN: NORMAL
PATH REPORT.RELEVANT HX SPEC: NORMAL
PHOTO IMAGE: NORMAL

## 2023-08-25 ENCOUNTER — TELEPHONE (OUTPATIENT)
Dept: SURGERY | Facility: CLINIC | Age: 28
End: 2023-08-25

## 2023-08-25 NOTE — TELEPHONE ENCOUNTER
Attempted to call patient for post op check.  No answer.  Message was left for patient to call back if they had any questions of concerns.     Trish Mcnamara PA-C

## 2023-11-07 ENCOUNTER — HOSPITAL ENCOUNTER (EMERGENCY)
Facility: CLINIC | Age: 28
Discharge: HOME | End: 2023-11-08
Attending: EMERGENCY MEDICINE | Admitting: EMERGENCY MEDICINE

## 2023-11-07 DIAGNOSIS — U07.1 COVID-19 VIRUS INFECTION: ICD-10-CM

## 2023-11-07 DIAGNOSIS — R07.89 CHEST TIGHTNESS: ICD-10-CM

## 2023-11-07 DIAGNOSIS — R50.9 FEVER IN ADULT: ICD-10-CM

## 2023-11-07 PROCEDURE — 99283 EMERGENCY DEPT VISIT LOW MDM: CPT

## 2023-11-08 VITALS
DIASTOLIC BLOOD PRESSURE: 79 MMHG | SYSTOLIC BLOOD PRESSURE: 123 MMHG | RESPIRATION RATE: 18 BRPM | OXYGEN SATURATION: 99 % | TEMPERATURE: 101.5 F | HEART RATE: 114 BPM

## 2023-11-08 LAB
ATRIAL RATE - MUSE: 120 BPM
DIASTOLIC BLOOD PRESSURE - MUSE: NORMAL MMHG
INTERPRETATION ECG - MUSE: NORMAL
P AXIS - MUSE: 35 DEGREES
PR INTERVAL - MUSE: 124 MS
QRS DURATION - MUSE: 80 MS
QT - MUSE: 296 MS
QTC - MUSE: 418 MS
R AXIS - MUSE: 47 DEGREES
SYSTOLIC BLOOD PRESSURE - MUSE: NORMAL MMHG
T AXIS - MUSE: 15 DEGREES
VENTRICULAR RATE- MUSE: 120 BPM

## 2023-11-08 PROCEDURE — 93005 ELECTROCARDIOGRAM TRACING: CPT

## 2023-11-08 PROCEDURE — 250N000013 HC RX MED GY IP 250 OP 250 PS 637: Performed by: EMERGENCY MEDICINE

## 2023-11-08 RX ORDER — IBUPROFEN 600 MG/1
600 TABLET, FILM COATED ORAL ONCE
Status: COMPLETED | OUTPATIENT
Start: 2023-11-08 | End: 2023-11-08

## 2023-11-08 RX ADMIN — IBUPROFEN 600 MG: 600 TABLET, FILM COATED ORAL at 00:16

## 2023-11-08 ASSESSMENT — ACTIVITIES OF DAILY LIVING (ADL): ADLS_ACUITY_SCORE: 35

## 2023-11-08 NOTE — ED TRIAGE NOTES
Presents with c/o fever and SOB. Patient tested positive for covid earlier today. ABCs intact during triage

## 2023-11-08 NOTE — DISCHARGE INSTRUCTIONS
DO NOT TAKE OXYCODONE WHILE TAKING PAXLOVID    Discharge Instructions  COVID-19    COVID-19 is the disease caused by a new coronavirus. The virus spreads from person-to-person primarily by droplets when an infected person coughs or sneezes and the droplets are then breathed in by another person.    Symptoms of COVID-19  Many people have no symptoms or mild symptoms.  Symptoms usually appear within a few days, but up to 14-days, after contact with a person with COVID-19.    A mild COVID-19 illness is like a cold and can have fever, cough, sneezing, sore throat, tiredness, headache, and muscle pain.    A moderate COVID-19 illness might include shortness of breath or pneumonia on a chest x-ray.    A severe COVID-19 illness causes significant breathing problems such as low oxygen levels or more serious pneumonia.  Some patients experience loss of taste or smell which is somewhat unique to COVID-19.      Isolation and Quarantine  Testing is recommended for any person with symptoms that could be COVID-19 and often for those exposed to COVID-19. The best way to stop the spread of the virus is to avoid contact with others.    A close contact exposure is being within 6 feet of someone with COVID for 15 minutes.    Isolation refers to sick people staying away from people who are not sick.    A person in quarantine is limiting activity because they were exposed and are waiting to see if they might become sick.    If you test positive for COVID and have no symptoms, you should stay home (isolation) for 5 full days after the day of the test. You should then wear a mask when around others for another 5 days.    If you test positive for COVID and have mild symptoms, you should stay home (isolation) for at least 5 days after your symptoms began. You can return to normal activities at that time, wearing a mask when around others, for another 5 days as long as your symptoms are improving/resolving and you have been without a fever  for 24 hours (without using fever-reducing medicine).    If you test positive for COVID and have more than mild symptoms, you should stay home (isolation) for at least 10 days after your symptoms began. You can return to normal activities at that time as long as your symptoms are improving and you have been without a fever for 24 hours (without using fever-reducing medicine).  For example, if you have a fever and cough for 6 days, you need to stay home 4 more days with no fever for a total of 10 days. Or, if you have a fever and cough for 10 days, you need to stay home one more day with no fever for a total of 11 days.    If you were exposed to COVID and are not vaccinated (or it has been more than six months from your Pfizer or Moderna vaccine or two months from J&J vaccine), you should stay home (quarantine) for 5 days and then wear a mask around others for 5 additional days. A COVID test at day 5 is recommended.    If you were exposed to COVID and are vaccinated (had a booster, had two shots of Pfizer or Moderna vaccine in the last five months, or had J&J vaccine within two months), you do not need to quarantine but should wear a mask around others for 10 days and get a COVID test on day 5.    If you have symptoms but a negative test, you should stay at home until you have mild/improving symptoms and are without fever for 24 hours, using the same judgment you would for when it is safe to return to work/school from strep throat, influenza, or the common cold. If you worsen, you should consider being re-evaluated.    If you are being tested for COVID because of symptoms and your test is pending, you should stay home until you know your test result.  More details on isolation and quarantine can be found on this website from the CDC:  https://www.cdc.gov/coronavirus/2019-ncov/your-health/quarantine-isolation.html    If I have COVID, how should I protect myself and others?    Do not go to work or school. Have a friend  or relative do your shopping. Do not use public transportation (bus, train) or ridesharing (Lyft, Uber).    Separate yourself from other people in your home. As much as possible, you should stay in one room and away from other people in your home. Also, use a separate bathroom, if possible. Avoid handling pets or other animals while sick.     Wear a facemask if you need to be around other people and cover your mouth and nose with a tissue when you cough or sneeze.     Avoid sharing personal household items. You should not share dishes, drinking glasses, forks/knives/spoons, towels, or bedding with other people in your home. After using these items, they should be washed with soap and water. Clean parts of your home that are touched often (doorknobs, faucets, countertops, etc.) daily.     Wash your hands often with soap and water for at least 20 seconds or use an alcohol-based hand  containing at least 60% alcohol.     Avoid touching your face.    Treat your symptoms. You can take Acetaminophen (Tylenol) to treat body aches and fever as needed for comfort. Ibuprofen (Advil or Motrin) can be used as well if you still have symptoms after taking Tylenol. Drink fluids. Rest.    Watch for worsening symptoms such as shortness of breath/difficulty breathing or very severe weakness.    Employers/workplaces are being asked by the Centers for Disease Control (CDC) to not request notes/documentation for you to return to work or prove that you were ill. You may choose to show your employer this paperwork. Also, repeat testing should not be required to return to work.    Exercise/Sports in rare cases, COVID could affect your heart in a way that makes exercise or participation in sports dangerous.  If you have a mild COVID illness (fever, cough, sore throat, and similar symptoms but no difficulty breathing or abnormalities of the lung): After your COVID symptoms have resolved, wait 14-days before returning to  activity.  If you have more than a mild illness (meaning that you have problems with your breathing or lungs) or if you participate in competitive or strenuous activity or have a history of heart disease: Please see your primary doctor/provider prior to return to activity/competition.    COVID treatments such as antiviral and antibody medications are available. They are recommended for those patients who have a risk for developing more severe COVID illness. Importantly, the treatments must be started early in the illness (within 5-7 days, depending on which treatment). These treatments may have been considered today during your visit. If you have other questions, contact your primary doctor/clinic.     You can learn more about COVID treatments from the [facility]:  https://www.health.[facility].[place].us/diseases/coronavirus/meds.html    Return to the Emergency Department if:    If you are developing worsening breathing, shortness of breath, or feel worse you should seek medical attention.  If you are uncertain, contact your health care provider/clinic. If you need emergency medical attention, call 911 and tell them you have been ill.

## 2023-11-08 NOTE — ED PROVIDER NOTES
History     Chief Complaint:  Fever     The history is provided by the patient.      Adrienne Guzman is a 28 year old female with a history of asthma who presents with fever which started this morning. She notes six hours ago she developed chest discomfort and shortness of breath as well. She denies coughing, vomiting, diarrhea, neck stiffness, leg swelling, or wheezing. She notes she tested positive for COVID at home and her daughter was diagnosed with COVID. No h/o MI/DVT/PE.    Independent Historian:   None - Patient Only      Medications:    Albuterol inhaler  Bupropion   Citalopram   Hydroxyzine     Past Medical History:    Anxiety  Asthma  Cholelithiasis  Depressive disorder  GERD  Stomach ulcer  Iron deficiency anemia   Postpartum hemorrhage     Past Surgical History:    Laparoscopic cholecystectomy      Physical Exam   Patient Vitals for the past 24 hrs:   BP Temp Temp src Pulse Resp SpO2   11/08/23 0130 -- -- -- 114 -- --   11/08/23 0011 123/79 (!) 101.5  F (38.6  C) Temporal (!) 126 18 99 %        Physical Exam  VS: Reviewed per above  HENT: Mucous membranes moist  EYES: sclera anicteric  CV: Rate as noted,  regular rhythm.   RESP: Effort normal. Breath sounds are normal bilaterally.  GI: no tenderness/rebound/guarding, not distended.  NEURO: Alert, moving all extremities  MSK: No deformity of the extremities  SKIN: Warm and dry    Emergency Department Course   ECG  ECG taken at 0029, ECG read at 0034  Sinus tachycardia   No significant change as compared to prior, dated 10/30/2022.  Rate 120 bpm. OH interval 124 ms. QRS duration 80 ms. QT/QTc 296/418 ms. P-R-T axes 35 47 15.     Emergency Department Course & Assessments:    Independent Interpretation (X-rays, CTs, rhythm strip):  None    Assessments/Consultations/Discussion of Management or Tests:   ED Course as of 11/08/23 0437   Tue Nov 07, 2023   5656 I obtained the patient's history and examined as noted above.    Wed Nov 08, 2023    0057 I rechecked the patient and explained findings.      Social Determinants of Health affecting care:   None    Disposition:  The patient was discharged to home.     Impression & Plan      Medical Decision Making:  Patient presents to the ER for evaluation of fever, chest tightness, shortness of breath.  On arrival temperature is 126 bpm.  She is febrile to 101.5  F.  She reports positive COVID test at home.  No neck stiffness or vomiting or diarrhea or abdominal pain or sore throat.  Lungs are clear to auscultation bilaterally.  No clinical signs of pneumonia or pneumothorax.  ECG sinus tachycardia without specific ischemic change.  After ibuprofen here, she feels improved.  Heart rate improved somewhat as well.  Offered laboratory evaluation and chest imaging, but also discussed that given short duration of symptoms and positive COVID test at home and daughter with positive COVID test here in the ER, suspect constellation of sx is related to fever/inflammation from COVID infection.  Patient agreed and would like to hold off on further testing at this juncture.  Patient was interested in Paxlovid prescription after discussion of risks and benefits.  After review of her current medications with her verbally, no major interactions were identified.  This was sent electronically to pharmacy of choice return precautions discussed prior to discharge.    Diagnosis:    ICD-10-CM    1. COVID-19 virus infection  U07.1       2. Chest tightness  R07.89       3. Fever in adult  R50.9          Discharge Medications:  Discharge Medication List as of 11/8/2023  1:11 AM        START taking these medications    Details   nirmatrelvir and ritonavir (PAXLOVID) 300 mg/100 mg therapy pack Take 3 tablets by mouth 2 times daily for 5 days, Disp-30 tablet, R-0, E-PrescribeDate of symptom onset: 11/7/23; Risk criteria met: Yes; Weight >40 kg Yes; Renal fxn: normal;  Drug-Drug interactions reviewed & addressed: Yes            Michelle  Disclosure:  I, Christal Mulu, am serving as a scribe at 11:55 PM on 11/7/2023 to document services personally performed by Leobardo Doss MD based on my observations and the provider's statements to me.      11/7/2023   Leobardo Doss MD Lindenbaum, Elan, MD  11/08/23 0438

## (undated) DEVICE — ESU GROUND PAD ADULT W/CORD E7507

## (undated) DEVICE — LINEN FULL SHEET 5511

## (undated) DEVICE — ENDO TROCAR FIRST ENTRY KII FIOS Z-THRD 05X100MM CTF03

## (undated) DEVICE — SU PDS II 0 ENDOLOOP EZ10G

## (undated) DEVICE — BAG CLEAR TRASH 1.3M 39X33" P4040C

## (undated) DEVICE — ENDO TROCAR SLEEVE KII Z-THREADED 05X100MM CTS02

## (undated) DEVICE — LINEN POUCH DBL 5427

## (undated) DEVICE — NDL 22GA 1.5"

## (undated) DEVICE — SOL NACL 0.9% IRRIG 3000ML BAG 2B7477

## (undated) DEVICE — SU VICRYL 0 UR-6 27" J603H

## (undated) DEVICE — ENDO POUCH UNIV RETRIEVAL SYSTEM INZII 10MM CD001

## (undated) DEVICE — GLOVE BIOGEL PI MICRO SZ 7.5 48575

## (undated) DEVICE — SYR 30ML LL W/O NDL 302832

## (undated) DEVICE — BLADE KNIFE SURG 11 371111

## (undated) DEVICE — ESU PENCIL W/HOLSTER E2350H

## (undated) DEVICE — LINEN HALF SHEET 5512

## (undated) DEVICE — ESU ELEC BLADE 2.75" COATED/INSULATED E1455

## (undated) DEVICE — Device

## (undated) DEVICE — GOWN XLG DISP 9545

## (undated) DEVICE — ENDO TROCAR BLUNT TIP KII BALLOON 12X100MM C0R47

## (undated) DEVICE — SU VICRYL 4-0 PS-2 18" UND J496H

## (undated) DEVICE — GLOVE BIOGEL PI MICRO INDICATOR UNDERGLOVE SZ 8.0 48980

## (undated) DEVICE — ESU CORD MONOPOLAR 10'  E0510

## (undated) DEVICE — SUCTION IRR STRYKERFLOW II W/TIP 250-070-520

## (undated) DEVICE — APPLICATORS COTTON TIP 6"X2 STERILE LF C15053-006

## (undated) RX ORDER — METOPROLOL TARTRATE 1 MG/ML
INJECTION, SOLUTION INTRAVENOUS
Status: DISPENSED
Start: 2023-08-06

## (undated) RX ORDER — GLYCOPYRROLATE 0.2 MG/ML
INJECTION INTRAMUSCULAR; INTRAVENOUS
Status: DISPENSED
Start: 2023-08-06

## (undated) RX ORDER — INDOCYANINE GREEN AND WATER 25 MG
KIT INJECTION
Status: DISPENSED
Start: 2023-08-06

## (undated) RX ORDER — LIDOCAINE HYDROCHLORIDE 10 MG/ML
INJECTION, SOLUTION EPIDURAL; INFILTRATION; INTRACAUDAL; PERINEURAL
Status: DISPENSED
Start: 2023-08-06

## (undated) RX ORDER — ETOMIDATE 2 MG/ML
INJECTION INTRAVENOUS
Status: DISPENSED
Start: 2023-08-06

## (undated) RX ORDER — BUPIVACAINE HYDROCHLORIDE AND EPINEPHRINE 5; 5 MG/ML; UG/ML
INJECTION, SOLUTION EPIDURAL; INTRACAUDAL; PERINEURAL
Status: DISPENSED
Start: 2023-08-06

## (undated) RX ORDER — CEFAZOLIN SODIUM/WATER 2 G/20 ML
SYRINGE (ML) INTRAVENOUS
Status: DISPENSED
Start: 2023-08-06

## (undated) RX ORDER — OXYCODONE HYDROCHLORIDE 5 MG/1
TABLET ORAL
Status: DISPENSED
Start: 2023-08-06

## (undated) RX ORDER — FENTANYL CITRATE 50 UG/ML
INJECTION, SOLUTION INTRAMUSCULAR; INTRAVENOUS
Status: DISPENSED
Start: 2023-08-06

## (undated) RX ORDER — FENTANYL CITRATE-0.9 % NACL/PF 10 MCG/ML
PLASTIC BAG, INJECTION (ML) INTRAVENOUS
Status: DISPENSED
Start: 2023-08-06

## (undated) RX ORDER — ONDANSETRON 2 MG/ML
INJECTION INTRAMUSCULAR; INTRAVENOUS
Status: DISPENSED
Start: 2023-08-06

## (undated) RX ORDER — DEXMEDETOMIDINE HYDROCHLORIDE 4 UG/ML
INJECTION, SOLUTION INTRAVENOUS
Status: DISPENSED
Start: 2023-08-06

## (undated) RX ORDER — DEXAMETHASONE SODIUM PHOSPHATE 4 MG/ML
INJECTION, SOLUTION INTRA-ARTICULAR; INTRALESIONAL; INTRAMUSCULAR; INTRAVENOUS; SOFT TISSUE
Status: DISPENSED
Start: 2023-08-06